# Patient Record
Sex: MALE | Race: WHITE | NOT HISPANIC OR LATINO | URBAN - METROPOLITAN AREA
[De-identification: names, ages, dates, MRNs, and addresses within clinical notes are randomized per-mention and may not be internally consistent; named-entity substitution may affect disease eponyms.]

---

## 2021-12-20 ENCOUNTER — EMERGENCY (EMERGENCY)
Facility: HOSPITAL | Age: 66
LOS: 0 days | Discharge: HOME | End: 2021-12-20
Attending: EMERGENCY MEDICINE
Payer: SELF-PAY

## 2021-12-20 VITALS
SYSTOLIC BLOOD PRESSURE: 172 MMHG | OXYGEN SATURATION: 94 % | DIASTOLIC BLOOD PRESSURE: 100 MMHG | HEART RATE: 61 BPM | TEMPERATURE: 99 F | RESPIRATION RATE: 18 BRPM

## 2021-12-20 VITALS — HEIGHT: 67 IN | WEIGHT: 134.92 LBS

## 2021-12-20 DIAGNOSIS — F11.10 OPIOID ABUSE, UNCOMPLICATED: ICD-10-CM

## 2021-12-20 DIAGNOSIS — Z86.19 PERSONAL HISTORY OF OTHER INFECTIOUS AND PARASITIC DISEASES: ICD-10-CM

## 2021-12-20 DIAGNOSIS — R10.12 LEFT UPPER QUADRANT PAIN: ICD-10-CM

## 2021-12-20 DIAGNOSIS — K52.9 NONINFECTIVE GASTROENTERITIS AND COLITIS, UNSPECIFIED: ICD-10-CM

## 2021-12-20 DIAGNOSIS — R11.2 NAUSEA WITH VOMITING, UNSPECIFIED: ICD-10-CM

## 2021-12-20 LAB
ALBUMIN SERPL ELPH-MCNC: 3.8 G/DL — SIGNIFICANT CHANGE UP (ref 3.3–5)
ALP SERPL-CCNC: 76 U/L — SIGNIFICANT CHANGE UP (ref 40–120)
ALT FLD-CCNC: 117 U/L — HIGH (ref 12–78)
ANION GAP SERPL CALC-SCNC: 6 MMOL/L — SIGNIFICANT CHANGE UP (ref 5–17)
APPEARANCE UR: CLEAR — SIGNIFICANT CHANGE UP
AST SERPL-CCNC: 89 U/L — HIGH (ref 15–37)
BASOPHILS # BLD AUTO: 0.03 K/UL — SIGNIFICANT CHANGE UP (ref 0–0.2)
BASOPHILS NFR BLD AUTO: 0.4 % — SIGNIFICANT CHANGE UP (ref 0–2)
BILIRUB SERPL-MCNC: 1 MG/DL — SIGNIFICANT CHANGE UP (ref 0.2–1.2)
BILIRUB UR-MCNC: NEGATIVE — SIGNIFICANT CHANGE UP
BUN SERPL-MCNC: 9 MG/DL — SIGNIFICANT CHANGE UP (ref 7–23)
CALCIUM SERPL-MCNC: 8.8 MG/DL — SIGNIFICANT CHANGE UP (ref 8.5–10.1)
CHLORIDE SERPL-SCNC: 100 MMOL/L — SIGNIFICANT CHANGE UP (ref 96–108)
CO2 SERPL-SCNC: 27 MMOL/L — SIGNIFICANT CHANGE UP (ref 22–31)
COLOR SPEC: YELLOW — SIGNIFICANT CHANGE UP
CREAT SERPL-MCNC: 0.84 MG/DL — SIGNIFICANT CHANGE UP (ref 0.5–1.3)
DIFF PNL FLD: NEGATIVE — SIGNIFICANT CHANGE UP
EOSINOPHIL # BLD AUTO: 0.01 K/UL — SIGNIFICANT CHANGE UP (ref 0–0.5)
EOSINOPHIL NFR BLD AUTO: 0.1 % — SIGNIFICANT CHANGE UP (ref 0–6)
GLUCOSE SERPL-MCNC: 130 MG/DL — HIGH (ref 70–99)
GLUCOSE UR QL: NEGATIVE MG/DL — SIGNIFICANT CHANGE UP
HCT VFR BLD CALC: 45.3 % — SIGNIFICANT CHANGE UP (ref 39–50)
HGB BLD-MCNC: 15.7 G/DL — SIGNIFICANT CHANGE UP (ref 13–17)
IMM GRANULOCYTES NFR BLD AUTO: 0.4 % — SIGNIFICANT CHANGE UP (ref 0–1.5)
KETONES UR-MCNC: NEGATIVE — SIGNIFICANT CHANGE UP
LEUKOCYTE ESTERASE UR-ACNC: NEGATIVE — SIGNIFICANT CHANGE UP
LIDOCAIN IGE QN: 29 U/L — LOW (ref 73–393)
LYMPHOCYTES # BLD AUTO: 1.88 K/UL — SIGNIFICANT CHANGE UP (ref 1–3.3)
LYMPHOCYTES # BLD AUTO: 28.1 % — SIGNIFICANT CHANGE UP (ref 13–44)
MCHC RBC-ENTMCNC: 33.8 PG — SIGNIFICANT CHANGE UP (ref 27–34)
MCHC RBC-ENTMCNC: 34.7 GM/DL — SIGNIFICANT CHANGE UP (ref 32–36)
MCV RBC AUTO: 97.4 FL — SIGNIFICANT CHANGE UP (ref 80–100)
MONOCYTES # BLD AUTO: 0.83 K/UL — SIGNIFICANT CHANGE UP (ref 0–0.9)
MONOCYTES NFR BLD AUTO: 12.4 % — SIGNIFICANT CHANGE UP (ref 2–14)
NEUTROPHILS # BLD AUTO: 3.9 K/UL — SIGNIFICANT CHANGE UP (ref 1.8–7.4)
NEUTROPHILS NFR BLD AUTO: 58.6 % — SIGNIFICANT CHANGE UP (ref 43–77)
NITRITE UR-MCNC: NEGATIVE — SIGNIFICANT CHANGE UP
PH UR: 6 — SIGNIFICANT CHANGE UP (ref 5–8)
PLATELET # BLD AUTO: 178 K/UL — SIGNIFICANT CHANGE UP (ref 150–400)
POTASSIUM SERPL-MCNC: 4.1 MMOL/L — SIGNIFICANT CHANGE UP (ref 3.5–5.3)
POTASSIUM SERPL-SCNC: 4.1 MMOL/L — SIGNIFICANT CHANGE UP (ref 3.5–5.3)
PROT SERPL-MCNC: 8.1 GM/DL — SIGNIFICANT CHANGE UP (ref 6–8.3)
PROT UR-MCNC: 15 MG/DL
RAPID RVP RESULT: SIGNIFICANT CHANGE UP
RBC # BLD: 4.65 M/UL — SIGNIFICANT CHANGE UP (ref 4.2–5.8)
RBC # FLD: 12 % — SIGNIFICANT CHANGE UP (ref 10.3–14.5)
SARS-COV-2 RNA SPEC QL NAA+PROBE: SIGNIFICANT CHANGE UP
SODIUM SERPL-SCNC: 133 MMOL/L — LOW (ref 135–145)
SP GR SPEC: 1.01 — SIGNIFICANT CHANGE UP (ref 1.01–1.02)
TROPONIN I, HIGH SENSITIVITY RESULT: 11.74 NG/L — SIGNIFICANT CHANGE UP
UROBILINOGEN FLD QL: NEGATIVE MG/DL — SIGNIFICANT CHANGE UP
WBC # BLD: 6.68 K/UL — SIGNIFICANT CHANGE UP (ref 3.8–10.5)
WBC # FLD AUTO: 6.68 K/UL — SIGNIFICANT CHANGE UP (ref 3.8–10.5)

## 2021-12-20 PROCEDURE — 83690 ASSAY OF LIPASE: CPT

## 2021-12-20 PROCEDURE — 71045 X-RAY EXAM CHEST 1 VIEW: CPT | Mod: 26

## 2021-12-20 PROCEDURE — 80053 COMPREHEN METABOLIC PANEL: CPT

## 2021-12-20 PROCEDURE — 81001 URINALYSIS AUTO W/SCOPE: CPT

## 2021-12-20 PROCEDURE — G1004: CPT

## 2021-12-20 PROCEDURE — 74177 CT ABD & PELVIS W/CONTRAST: CPT | Mod: 26,MG

## 2021-12-20 PROCEDURE — 96375 TX/PRO/DX INJ NEW DRUG ADDON: CPT

## 2021-12-20 PROCEDURE — 36415 COLL VENOUS BLD VENIPUNCTURE: CPT

## 2021-12-20 PROCEDURE — 71045 X-RAY EXAM CHEST 1 VIEW: CPT

## 2021-12-20 PROCEDURE — 85025 COMPLETE CBC W/AUTO DIFF WBC: CPT

## 2021-12-20 PROCEDURE — 93005 ELECTROCARDIOGRAM TRACING: CPT

## 2021-12-20 PROCEDURE — 96374 THER/PROPH/DIAG INJ IV PUSH: CPT

## 2021-12-20 PROCEDURE — 84484 ASSAY OF TROPONIN QUANT: CPT

## 2021-12-20 PROCEDURE — 93010 ELECTROCARDIOGRAM REPORT: CPT

## 2021-12-20 PROCEDURE — 80074 ACUTE HEPATITIS PANEL: CPT

## 2021-12-20 PROCEDURE — 99285 EMERGENCY DEPT VISIT HI MDM: CPT

## 2021-12-20 PROCEDURE — 96361 HYDRATE IV INFUSION ADD-ON: CPT

## 2021-12-20 PROCEDURE — 99284 EMERGENCY DEPT VISIT MOD MDM: CPT | Mod: 25

## 2021-12-20 PROCEDURE — 0225U NFCT DS DNA&RNA 21 SARSCOV2: CPT

## 2021-12-20 PROCEDURE — 87521 HEPATITIS C PROBE&RVRS TRNSC: CPT

## 2021-12-20 PROCEDURE — 74177 CT ABD & PELVIS W/CONTRAST: CPT | Mod: MG

## 2021-12-20 RX ORDER — FAMOTIDINE 10 MG/ML
0.5 INJECTION INTRAVENOUS
Qty: 0 | Refills: 0 | DISCHARGE
Start: 2021-12-20 | End: 2022-01-02

## 2021-12-20 RX ORDER — METHADONE HYDROCHLORIDE 40 MG/1
50 TABLET ORAL ONCE
Refills: 0 | Status: DISCONTINUED | OUTPATIENT
Start: 2021-12-20 | End: 2021-12-20

## 2021-12-20 RX ORDER — ONDANSETRON 8 MG/1
4 TABLET, FILM COATED ORAL ONCE
Refills: 0 | Status: COMPLETED | OUTPATIENT
Start: 2021-12-20 | End: 2021-12-20

## 2021-12-20 RX ORDER — SODIUM CHLORIDE 9 MG/ML
1000 INJECTION INTRAMUSCULAR; INTRAVENOUS; SUBCUTANEOUS ONCE
Refills: 0 | Status: COMPLETED | OUTPATIENT
Start: 2021-12-20 | End: 2021-12-20

## 2021-12-20 RX ORDER — ONDANSETRON 8 MG/1
1 TABLET, FILM COATED ORAL
Qty: 20 | Refills: 0
Start: 2021-12-20

## 2021-12-20 RX ORDER — FAMOTIDINE 10 MG/ML
1 INJECTION INTRAVENOUS
Qty: 14 | Refills: 0
Start: 2021-12-20 | End: 2022-01-02

## 2021-12-20 RX ORDER — FAMOTIDINE 10 MG/ML
20 INJECTION INTRAVENOUS ONCE
Refills: 0 | Status: COMPLETED | OUTPATIENT
Start: 2021-12-20 | End: 2021-12-20

## 2021-12-20 RX ADMIN — FAMOTIDINE 20 MILLIGRAM(S): 10 INJECTION INTRAVENOUS at 16:29

## 2021-12-20 RX ADMIN — SODIUM CHLORIDE 1000 MILLILITER(S): 9 INJECTION INTRAMUSCULAR; INTRAVENOUS; SUBCUTANEOUS at 15:54

## 2021-12-20 RX ADMIN — ONDANSETRON 4 MILLIGRAM(S): 8 TABLET, FILM COATED ORAL at 14:54

## 2021-12-20 RX ADMIN — SODIUM CHLORIDE 1000 MILLILITER(S): 9 INJECTION INTRAMUSCULAR; INTRAVENOUS; SUBCUTANEOUS at 14:54

## 2021-12-20 RX ADMIN — METHADONE HYDROCHLORIDE 50 MILLIGRAM(S): 40 TABLET ORAL at 19:48

## 2021-12-20 NOTE — ED STATDOCS - PROGRESS NOTE DETAILS
Michael De La Fuente for attending Dr. Arora: 67 y/o male with a PMHx of aortic aneurysm presents to the ED c/o vomiting x3 days. Pt also reports he missed his Methadone dose today. NKDA. No other complaints at this time. Will send pt to main ED for further evaluation.

## 2021-12-20 NOTE — ED PROVIDER NOTE - OBJECTIVE STATEMENT
65 y/o M with PMH of hep C, opiate abuse, AAA on methadone presents with nausea/vomiting x 3 days. Pt states he is able to tolerate liquids, but has been unable to consume solids without vomiting. Denies fever, chills. Reports LUQ abdominal pain associated with vomiting. Has never had endoscopy. States he does not follow up with GI. Denies history of abdominal surgery. States he is passing stool without difficulty.

## 2021-12-20 NOTE — ED ADULT NURSE REASSESSMENT NOTE - NS ED NURSE REASSESS COMMENT FT1
pt. hypertensive, asymptomatic. MD Youssef made aware. okay to dc with PMD f/u. pt. educated on signs od symptomatic HTN, and instructed to call 911 or go to closest ER when symptoms occur.

## 2021-12-20 NOTE — ED PROVIDER NOTE - PATIENT PORTAL LINK FT
You can access the FollowMyHealth Patient Portal offered by [facility] by registering at the following website: [email or web address]. By joining [employer]’s FollowMyHealth portal, you will also be able to view your health information using other applications (apps) compatible with our system.

## 2021-12-20 NOTE — ED PROVIDER NOTE - SKIN, MLM
Skin normal color for race, warm, dry and intact. No evidence of rash. no tactile warmth, no jaundice

## 2021-12-20 NOTE — ED ADULT NURSE NOTE - OBJECTIVE STATEMENT
PT to ED from home for c/o abdominal pain, nausea, and vomiting x2 days. Denies fever and chills. Denies diarrhea. Denies chest pain and sob. PT a&ox4. No distress.

## 2021-12-20 NOTE — ED PROVIDER NOTE - ENMT, MLM
Airway patent, Nasal mucosa clear. Mouth with mildly dry mucus.  Throat has no vesicles, no oropharyngeal exudates and uvula is midline.

## 2021-12-20 NOTE — ED PROVIDER NOTE - PROGRESS NOTE DETAILS
Michael Youssef : 67 y/o white M with a PMHx of drug abuse currently on Methadone program in [place] attempting to enter [facility], hepatitis C treated with interferon on the past (not on currently) ambulatory to ED c/o 3 days of nausea, loss of appetite, vomiting, unable to hold down solid food and occasional cramping abd pain and occasional chills. No fever. no urine complaints. no change in chronic cough. no SOB. no CP. No rash. Last methadone yesterday. no diarrhea. (+) flatus.  PCP: Dr. Beaulieu; No GI doctor Patient tolerating PO. mild sigmoid colitis, will treat symptomatically. WIll provide GI referral. - Guido Sheehan PA-C Michael Youssef : 65 y/o white M with a PMHx of drug abuse currently on Methadone program in [place] attempting to enter [facility], hepatitis C treated with interferon on the past (not on currently) ambulatory to ED c/o 3 days of nausea, loss of appetite, vomiting, unable to hold down solid food and occasional cramping abd pain and occasional chills. No fever. no urine complaints. no change in chronic cough. no SOB. no CP. No rash. Last methadone yesterday. no diarrhea. (+) flatus.  PCP: Dr. Beaulieu; No GI doctor       See P/E section for the P/E.

## 2021-12-20 NOTE — ED PROVIDER NOTE - CARE PROVIDER_API CALL
Martin Srinivasan)  Gastroenterology; Internal Medicine  [address] [place] [address]  Phone: [phone]  Fax: [phone]  Follow Up Time:

## 2021-12-20 NOTE — ED PROVIDER NOTE - CONSTITUTIONAL, MLM
normal... awake, alert, oriented to person, place, time/situation;  older white male appears older than stated age, no respiratory discomfort, mildly ill appearing but non toxic

## 2021-12-20 NOTE — ED PROVIDER NOTE - NS ED ROS FT
GEN: Denies fever, chills, sick contacts, recent travel  HEENT: Denies dizziness, blurry vision, HA  Cardiac: Denies CP, SOB, palpitations  Lungs: Denies cough, wheezing  PV: Denies LE swelling  GI: nausea/vomiting, LUQ pain. Denies diarrhea, constipation, flank pain  : Denies hematuria, dysuria, frequency, urgency  Skin: Denies rash, redness, open wound  MSK: Denies pain, decreased ROM  Neuro: Denies dizziness, difficulty speaking, difficulty swallowing, numbness/tingling in extremities, loss of bowel/bladder control, weakness in extremities

## 2021-12-20 NOTE — ED PROVIDER NOTE - PHYSICAL EXAMINATION
Gen: Well appearing. A&O x3.   HEENT: NC/AT. Dentition in good repair. No oropharyngeal erythema, tonsilar enlargement or exudates. Uvula midline. No submandibular or anterior cervical lymphadenopathy. TM well visualized bilaterally. Nares patent.  Cardiac: s1s2, RRR.  Lungs: CTAB, no chest wall tenderness.  Abdomen: NBS x4, soft, LUQ tenderness. No CVAT.  MSK: No obvious deformity to extremities. Full ROM in all extremities  Neuro: CN II-XII intact. Sensation intact to light touch in all extremities. 5/5 strength in all extremities.   PV: No LE edema or calf tenderness. Distal pulses 2+ in all extremities.

## 2021-12-20 NOTE — ED PROVIDER NOTE - CLINICAL SUMMARY MEDICAL DECISION MAKING FREE TEXT BOX
65 y/o white M with a PMHx of drug abuse currently on Methadone program in [place] attempting to enter [facility], hepatitis C treated with interferon on the past (not on currently) ambulatory to ED c/o 3 days of nausea, loss of appetite, vomiting, unable to hold down solid food and occasional cramping abd pain and occasional chills.  On exam, mildly ill non toxic, no focal abd tenderness or jaundice. no GI MD. Plan: EKG, CXR, CT abd pelvis, labs including lipase, RVP  / COVID, urine, troponin, IV fluids, IV Zofran Pepcid. monitor, observe, reassess

## 2021-12-20 NOTE — ED PROVIDER NOTE - ATTENDING CONTRIBUTION TO CARE
I, Vadim Youssef MD, personally saw the patient with the PA, and completed the key components of the history and physical exam. I then discussed the management plan with the PA.

## 2021-12-22 NOTE — ED POST DISCHARGE NOTE - DETAILS
No voicemail on number listed. Patient provided urgent GI referral at discharge. Will send letter via [employer]. - Guido Sheehan PA-C

## 2022-05-07 PROBLEM — B19.20 UNSPECIFIED VIRAL HEPATITIS C WITHOUT HEPATIC COMA: Chronic | Status: ACTIVE | Noted: 2021-12-24

## 2022-05-07 PROBLEM — F11.10 OPIOID ABUSE, UNCOMPLICATED: Chronic | Status: ACTIVE | Noted: 2021-12-24

## 2022-05-09 NOTE — ASU PATIENT PROFILE, ADULT - NSICDXPASTMEDICALHX_GEN_ALL_CORE_FT
PAST MEDICAL HISTORY:  Acute kidney injury     Acute myocardial infarction     Acute respiratory failure with hypoxia     Frostbite of finger     Hepatitis C     History of frostbite     History of gangrene     HLD (hyperlipidemia)     HTN (hypertension)     Hypothermia not due to cold exposure     Opiate abuse, episodic     Paroxysmal atrial fibrillation     Pulmonary emboli     Thrombocytopenia

## 2022-05-09 NOTE — ASU PATIENT PROFILE, ADULT - FALL HARM RISK - HARM RISK INTERVENTIONS

## 2022-05-10 ENCOUNTER — OUTPATIENT (OUTPATIENT)
Dept: INPATIENT UNIT | Facility: HOSPITAL | Age: 67
LOS: 1 days | Discharge: HOME | End: 2022-05-10
Payer: MEDICARE

## 2022-05-10 VITALS
HEIGHT: 67 IN | RESPIRATION RATE: 15 BRPM | OXYGEN SATURATION: 96 % | DIASTOLIC BLOOD PRESSURE: 97 MMHG | WEIGHT: 109.79 LBS | SYSTOLIC BLOOD PRESSURE: 170 MMHG | TEMPERATURE: 98 F | HEART RATE: 51 BPM

## 2022-05-10 DIAGNOSIS — I96 GANGRENE, NOT ELSEWHERE CLASSIFIED: ICD-10-CM

## 2022-05-10 DIAGNOSIS — M79.641 PAIN IN RIGHT HAND: ICD-10-CM

## 2022-05-10 DIAGNOSIS — T34.531A: ICD-10-CM

## 2022-05-10 PROCEDURE — U0003: CPT

## 2022-05-10 PROCEDURE — 99204 OFFICE O/P NEW MOD 45 MIN: CPT

## 2022-05-10 PROCEDURE — 88305 TISSUE EXAM BY PATHOLOGIST: CPT | Mod: 26

## 2022-05-10 PROCEDURE — 36415 COLL VENOUS BLD VENIPUNCTURE: CPT

## 2022-05-10 PROCEDURE — ZZZZZ: CPT

## 2022-05-10 PROCEDURE — 93010 ELECTROCARDIOGRAM REPORT: CPT

## 2022-05-10 PROCEDURE — 80048 BASIC METABOLIC PNL TOTAL CA: CPT

## 2022-05-10 PROCEDURE — U0005: CPT

## 2022-05-10 PROCEDURE — 97116 GAIT TRAINING THERAPY: CPT | Mod: GP

## 2022-05-10 PROCEDURE — 86850 RBC ANTIBODY SCREEN: CPT

## 2022-05-10 PROCEDURE — 93005 ELECTROCARDIOGRAM TRACING: CPT

## 2022-05-10 PROCEDURE — 86901 BLOOD TYPING SEROLOGIC RH(D): CPT

## 2022-05-10 PROCEDURE — 86900 BLOOD TYPING SEROLOGIC ABO: CPT

## 2022-05-10 PROCEDURE — 87070 CULTURE OTHR SPECIMN AEROBIC: CPT

## 2022-05-10 PROCEDURE — 97162 PT EVAL MOD COMPLEX 30 MIN: CPT | Mod: GP

## 2022-05-10 PROCEDURE — 88305 TISSUE EXAM BY PATHOLOGIST: CPT

## 2022-05-10 PROCEDURE — 87077 CULTURE AEROBIC IDENTIFY: CPT

## 2022-05-10 PROCEDURE — 87186 SC STD MICRODIL/AGAR DIL: CPT

## 2022-05-10 PROCEDURE — 85025 COMPLETE CBC W/AUTO DIFF WBC: CPT

## 2022-05-10 RX ORDER — TRAMADOL HYDROCHLORIDE 50 MG/1
50 TABLET ORAL EVERY 6 HOURS
Refills: 0 | Status: DISCONTINUED | OUTPATIENT
Start: 2022-05-10 | End: 2022-05-11

## 2022-05-10 RX ORDER — HYDROMORPHONE HYDROCHLORIDE 2 MG/ML
0.5 INJECTION INTRAMUSCULAR; INTRAVENOUS; SUBCUTANEOUS
Refills: 0 | Status: DISCONTINUED | OUTPATIENT
Start: 2022-05-10 | End: 2022-05-10

## 2022-05-10 RX ORDER — CARVEDILOL PHOSPHATE 80 MG/1
12.5 CAPSULE, EXTENDED RELEASE ORAL
Refills: 0 | Status: DISCONTINUED | OUTPATIENT
Start: 2022-05-10 | End: 2022-05-10

## 2022-05-10 RX ORDER — APIXABAN 2.5 MG/1
5 TABLET, FILM COATED ORAL
Refills: 0 | Status: DISCONTINUED | OUTPATIENT
Start: 2022-05-11 | End: 2022-05-11

## 2022-05-10 RX ORDER — ONDANSETRON 8 MG/1
4 TABLET, FILM COATED ORAL ONCE
Refills: 0 | Status: DISCONTINUED | OUTPATIENT
Start: 2022-05-10 | End: 2022-05-10

## 2022-05-10 RX ORDER — OXYCODONE HYDROCHLORIDE 5 MG/1
5 TABLET ORAL EVERY 4 HOURS
Refills: 0 | Status: DISCONTINUED | OUTPATIENT
Start: 2022-05-10 | End: 2022-05-11

## 2022-05-10 RX ORDER — CEFAZOLIN SODIUM 1 G
2000 VIAL (EA) INJECTION EVERY 8 HOURS
Refills: 0 | Status: COMPLETED | OUTPATIENT
Start: 2022-05-10 | End: 2022-05-11

## 2022-05-10 RX ORDER — SODIUM CHLORIDE 9 MG/ML
1000 INJECTION, SOLUTION INTRAVENOUS
Refills: 0 | Status: DISCONTINUED | OUTPATIENT
Start: 2022-05-10 | End: 2022-05-10

## 2022-05-10 RX ORDER — FAMOTIDINE 10 MG/ML
20 INJECTION INTRAVENOUS DAILY
Refills: 0 | Status: DISCONTINUED | OUTPATIENT
Start: 2022-05-10 | End: 2022-05-11

## 2022-05-10 RX ORDER — CARVEDILOL PHOSPHATE 80 MG/1
12.5 CAPSULE, EXTENDED RELEASE ORAL EVERY 12 HOURS
Refills: 0 | Status: DISCONTINUED | OUTPATIENT
Start: 2022-05-10 | End: 2022-05-11

## 2022-05-10 RX ORDER — OXYCODONE HYDROCHLORIDE 5 MG/1
5 TABLET ORAL ONCE
Refills: 0 | Status: DISCONTINUED | OUTPATIENT
Start: 2022-05-10 | End: 2022-05-10

## 2022-05-10 RX ORDER — ONDANSETRON 8 MG/1
4 TABLET, FILM COATED ORAL EVERY 6 HOURS
Refills: 0 | Status: DISCONTINUED | OUTPATIENT
Start: 2022-05-10 | End: 2022-05-11

## 2022-05-10 RX ORDER — NIFEDIPINE 30 MG
30 TABLET, EXTENDED RELEASE 24 HR ORAL DAILY
Refills: 0 | Status: DISCONTINUED | OUTPATIENT
Start: 2022-05-10 | End: 2022-05-11

## 2022-05-10 RX ORDER — FENTANYL CITRATE 50 UG/ML
50 INJECTION INTRAVENOUS
Refills: 0 | Status: DISCONTINUED | OUTPATIENT
Start: 2022-05-10 | End: 2022-05-10

## 2022-05-10 RX ORDER — OXYCODONE HYDROCHLORIDE 5 MG/1
10 TABLET ORAL EVERY 4 HOURS
Refills: 0 | Status: DISCONTINUED | OUTPATIENT
Start: 2022-05-10 | End: 2022-05-11

## 2022-05-10 RX ORDER — FERROUS SULFATE 325(65) MG
325 TABLET ORAL DAILY
Refills: 0 | Status: DISCONTINUED | OUTPATIENT
Start: 2022-05-10 | End: 2022-05-11

## 2022-05-10 RX ORDER — ACETAMINOPHEN 500 MG
1000 TABLET ORAL ONCE
Refills: 0 | Status: COMPLETED | OUTPATIENT
Start: 2022-05-10 | End: 2022-05-10

## 2022-05-10 RX ADMIN — CARVEDILOL PHOSPHATE 12.5 MILLIGRAM(S): 80 CAPSULE, EXTENDED RELEASE ORAL at 23:27

## 2022-05-10 RX ADMIN — HYDROMORPHONE HYDROCHLORIDE 0.5 MILLIGRAM(S): 2 INJECTION INTRAMUSCULAR; INTRAVENOUS; SUBCUTANEOUS at 17:04

## 2022-05-10 RX ADMIN — Medication 100 MILLIGRAM(S): at 23:27

## 2022-05-10 RX ADMIN — Medication 400 MILLIGRAM(S): at 16:58

## 2022-05-10 RX ADMIN — HYDROMORPHONE HYDROCHLORIDE 0.5 MILLIGRAM(S): 2 INJECTION INTRAMUSCULAR; INTRAVENOUS; SUBCUTANEOUS at 17:15

## 2022-05-10 RX ADMIN — HYDROMORPHONE HYDROCHLORIDE 0.5 MILLIGRAM(S): 2 INJECTION INTRAMUSCULAR; INTRAVENOUS; SUBCUTANEOUS at 21:39

## 2022-05-10 RX ADMIN — HYDROMORPHONE HYDROCHLORIDE 0.5 MILLIGRAM(S): 2 INJECTION INTRAMUSCULAR; INTRAVENOUS; SUBCUTANEOUS at 20:42

## 2022-05-10 RX ADMIN — OXYCODONE HYDROCHLORIDE 10 MILLIGRAM(S): 5 TABLET ORAL at 17:00

## 2022-05-10 RX ADMIN — Medication 1000 MILLIGRAM(S): at 17:04

## 2022-05-10 RX ADMIN — HYDROMORPHONE HYDROCHLORIDE 0.5 MILLIGRAM(S): 2 INJECTION INTRAMUSCULAR; INTRAVENOUS; SUBCUTANEOUS at 17:05

## 2022-05-10 RX ADMIN — HYDROMORPHONE HYDROCHLORIDE 0.5 MILLIGRAM(S): 2 INJECTION INTRAMUSCULAR; INTRAVENOUS; SUBCUTANEOUS at 16:50

## 2022-05-10 NOTE — PHYSICAL THERAPY INITIAL EVALUATION ADULT - PATIENT/FAMILY/SIGNIFICANT OTHER GOALS STATEMENT, PT EVAL
pt explains he collapsed in sitting position in [place] during a major nor'easter next to/behind  a restaurant 1/29/22,did not have gloves on

## 2022-05-10 NOTE — PHYSICAL THERAPY INITIAL EVALUATION ADULT - GENERAL OBSERVATIONS, REHAB EVAL
supine recumbent in bed ,wears eyeglasses,awake,alert,Ox3 ,L hand appears mummified at distal half all digits , R hand is elevated on pillows with bulky bandaging post-op (C/D/I)

## 2022-05-10 NOTE — PHYSICAL THERAPY INITIAL EVALUATION ADULT - ASSISTIVE DEVICE FOR TRANSFER: GAIT, REHAB EVAL
no device; explained to patient if need should arise that he requires increased gait support ,could likely manage a L axillary crutch in the 1st web space L hand

## 2022-05-10 NOTE — PROGRESS NOTE ADULT - SUBJECTIVE AND OBJECTIVE BOX
Postop Check    Patient tolerated the procedure well. Patient seen and examined at bedside. No acute complaints at this time. Pain well controlled. Denies chest pain, shortness of breath, nausea or vomiting.     PE:  Vital Signs Last 24 Hrs  T(C): 36.7 (05-10-22 @ 16:33), Max: 36.7 (05-10-22 @ 16:33)  T(F): 98.1 (05-10-22 @ 16:33), Max: 98.1 (05-10-22 @ 16:33)  HR: 69 (05-10-22 @ 17:30) (51 - 69)  BP: 117/54 (05-10-22 @ 17:30) (117/54 - 189/100)  BP(mean): --  RR: 14 (05-10-22 @ 17:30) (13 - 15)  SpO2: 96% (05-10-22 @ 17:30) (96% - 100%)    General: NAD, resting comfortably in bed  R UE:   Volar slab and Dressing C/D/I  Compartments soft and compressible  No calf tenderness bilaterally  +Axillary/Musculocutaneous/Radial/Median/AIN/PIN intact  SILT C5-T1  2+ radial pulses                A/P:  67y m s/p digit 1-5 ampuation  -PT/OT   -WBAT  -IV ABx for 24 hours  -Pain Control  -DVT ppx w/eliqius  -Continue perioperative abx x 24 hours  -FU AM Labs  -Rest, ice, compress and elevate the extremity as we needed  -Incentive Spirometry  -Medical management appreciated  -DispoL: nursing faiclity 5/11

## 2022-05-10 NOTE — PHYSICAL THERAPY INITIAL EVALUATION ADULT - ACTIVE RANGE OF MOTION EXAMINATION, REHAB EVAL
diminished /composite flexion all digits L hand with "mumification" due to dry gangrene; R shoulder/elbow WNL/Left UE Active ROM was WFL (within functional limits)/bilateral  lower extremity Active ROM was WFL (within functional limits)/deficits as listed below

## 2022-05-10 NOTE — PHYSICAL THERAPY INITIAL EVALUATION ADULT - PERTINENT HX OF CURRENT PROBLEM, REHAB EVAL
admitted from [facility] with h/o frostbite R hand with tissue necrosis for amputation affected fingers R hand

## 2022-05-10 NOTE — PHYSICAL THERAPY INITIAL EVALUATION ADULT - MUSCLE TONE ASSESSMENT, REHAB EVAL
+guarding R hand pt is lean/appears underweight,reports he is eating well but ca't seem to gain weight/bilateral upper extremities/bilateral lower extremities/neck/trunk/normal/mildly decreased tone

## 2022-05-10 NOTE — PHYSICAL THERAPY INITIAL EVALUATION ADULT - PRECAUTIONS/LIMITATIONS, REHAB EVAL
surgical precautions elevate R hand; pt relates h/o recent fall with dry scab/healing wound L elbow/fall precautions/surgical precautions

## 2022-05-10 NOTE — PHYSICAL THERAPY INITIAL EVALUATION ADULT - DIAGNOSIS, PT EVAL
Frostbite R hand with tissue necrosis s/p amputation R digits Frostbite B hands with tissue necrosis bilaterally affecting all digits , s/p amputation R digits 5/10/22

## 2022-05-10 NOTE — PHYSICAL THERAPY INITIAL EVALUATION ADULT - IMPAIRMENTS FOUND, PT EVAL
loss of dexterity/functional use both hands s/p amputation all digits R dominan hand (plans to return for amp L digits in future )/fine motor/integumentary integrity/muscle strength/ROM/sensory integrity

## 2022-05-10 NOTE — PHYSICAL THERAPY INITIAL EVALUATION ADULT - PATIENT PROFILE REVIEW, REHAB EVAL
yes pt states he lives alone in [place], his son & GF reside together nearby ; ot is legally  but not together with wife ; states he had a GF for 27 years but she passed away few years ago/yes

## 2022-05-10 NOTE — PHYSICAL THERAPY INITIAL EVALUATION ADULT - LONG TERM MEMORY, REHAB EVAL
pt with vague recall of events leading to his collapse in snow 1/29/22 ,states he was in a coma (?medically induced ) x 2 months at [facility] prior to going to rehab at [facility] ,was very weak /deconditioned due to being bedbound >2 months

## 2022-05-11 VITALS
TEMPERATURE: 98 F | DIASTOLIC BLOOD PRESSURE: 67 MMHG | RESPIRATION RATE: 16 BRPM | SYSTOLIC BLOOD PRESSURE: 131 MMHG | OXYGEN SATURATION: 100 % | HEART RATE: 66 BPM

## 2022-05-11 LAB
ANION GAP SERPL CALC-SCNC: 7 MMOL/L — SIGNIFICANT CHANGE UP (ref 5–17)
BASOPHILS # BLD AUTO: 0.03 K/UL — SIGNIFICANT CHANGE UP (ref 0–0.2)
BASOPHILS NFR BLD AUTO: 0.4 % — SIGNIFICANT CHANGE UP (ref 0–2)
BUN SERPL-MCNC: 22 MG/DL — SIGNIFICANT CHANGE UP (ref 7–23)
CALCIUM SERPL-MCNC: 8.5 MG/DL — SIGNIFICANT CHANGE UP (ref 8.5–10.1)
CHLORIDE SERPL-SCNC: 102 MMOL/L — SIGNIFICANT CHANGE UP (ref 96–108)
CO2 SERPL-SCNC: 27 MMOL/L — SIGNIFICANT CHANGE UP (ref 22–31)
CREAT SERPL-MCNC: 0.74 MG/DL — SIGNIFICANT CHANGE UP (ref 0.5–1.3)
EGFR: 101 ML/MIN/1.73M2 — SIGNIFICANT CHANGE UP
EOSINOPHIL # BLD AUTO: 0.14 K/UL — SIGNIFICANT CHANGE UP (ref 0–0.5)
EOSINOPHIL NFR BLD AUTO: 2 % — SIGNIFICANT CHANGE UP (ref 0–6)
GLUCOSE SERPL-MCNC: 90 MG/DL — SIGNIFICANT CHANGE UP (ref 70–99)
HCT VFR BLD CALC: 31.6 % — LOW (ref 39–50)
HGB BLD-MCNC: 10.1 G/DL — LOW (ref 13–17)
IMM GRANULOCYTES NFR BLD AUTO: 0.3 % — SIGNIFICANT CHANGE UP (ref 0–1.5)
LYMPHOCYTES # BLD AUTO: 0.99 K/UL — LOW (ref 1–3.3)
LYMPHOCYTES # BLD AUTO: 14.1 % — SIGNIFICANT CHANGE UP (ref 13–44)
MCHC RBC-ENTMCNC: 32 GM/DL — SIGNIFICANT CHANGE UP (ref 32–36)
MCHC RBC-ENTMCNC: 32 PG — SIGNIFICANT CHANGE UP (ref 27–34)
MCV RBC AUTO: 100 FL — SIGNIFICANT CHANGE UP (ref 80–100)
MONOCYTES # BLD AUTO: 1.01 K/UL — HIGH (ref 0–0.9)
MONOCYTES NFR BLD AUTO: 14.4 % — HIGH (ref 2–14)
NEUTROPHILS # BLD AUTO: 4.81 K/UL — SIGNIFICANT CHANGE UP (ref 1.8–7.4)
NEUTROPHILS NFR BLD AUTO: 68.8 % — SIGNIFICANT CHANGE UP (ref 43–77)
PLATELET # BLD AUTO: 219 K/UL — SIGNIFICANT CHANGE UP (ref 150–400)
POTASSIUM SERPL-MCNC: 4.4 MMOL/L — SIGNIFICANT CHANGE UP (ref 3.5–5.3)
POTASSIUM SERPL-SCNC: 4.4 MMOL/L — SIGNIFICANT CHANGE UP (ref 3.5–5.3)
RBC # BLD: 3.16 M/UL — LOW (ref 4.2–5.8)
RBC # FLD: 14.6 % — HIGH (ref 10.3–14.5)
SARS-COV-2 RNA SPEC QL NAA+PROBE: SIGNIFICANT CHANGE UP
SODIUM SERPL-SCNC: 136 MMOL/L — SIGNIFICANT CHANGE UP (ref 135–145)
WBC # BLD: 7 K/UL — SIGNIFICANT CHANGE UP (ref 3.8–10.5)
WBC # FLD AUTO: 7 K/UL — SIGNIFICANT CHANGE UP (ref 3.8–10.5)

## 2022-05-11 PROCEDURE — 93010 ELECTROCARDIOGRAM REPORT: CPT

## 2022-05-11 PROCEDURE — 99205 OFFICE O/P NEW HI 60 MIN: CPT

## 2022-05-11 RX ORDER — OXYCODONE HYDROCHLORIDE 5 MG/1
1 TABLET ORAL
Qty: 0 | Refills: 0 | DISCHARGE
Start: 2022-05-11

## 2022-05-11 RX ORDER — APIXABAN 2.5 MG/1
1 TABLET, FILM COATED ORAL
Qty: 0 | Refills: 0 | DISCHARGE
Start: 2022-05-11

## 2022-05-11 RX ORDER — TRAMADOL HYDROCHLORIDE 50 MG/1
1 TABLET ORAL
Qty: 0 | Refills: 0 | DISCHARGE
Start: 2022-05-11

## 2022-05-11 RX ADMIN — FAMOTIDINE 20 MILLIGRAM(S): 10 INJECTION INTRAVENOUS at 10:09

## 2022-05-11 RX ADMIN — Medication 325 MILLIGRAM(S): at 10:09

## 2022-05-11 RX ADMIN — TRAMADOL HYDROCHLORIDE 50 MILLIGRAM(S): 50 TABLET ORAL at 10:58

## 2022-05-11 RX ADMIN — CARVEDILOL PHOSPHATE 12.5 MILLIGRAM(S): 80 CAPSULE, EXTENDED RELEASE ORAL at 10:09

## 2022-05-11 RX ADMIN — Medication 1 TABLET(S): at 10:09

## 2022-05-11 RX ADMIN — APIXABAN 5 MILLIGRAM(S): 2.5 TABLET, FILM COATED ORAL at 10:45

## 2022-05-11 RX ADMIN — OXYCODONE HYDROCHLORIDE 10 MILLIGRAM(S): 5 TABLET ORAL at 01:36

## 2022-05-11 RX ADMIN — TRAMADOL HYDROCHLORIDE 50 MILLIGRAM(S): 50 TABLET ORAL at 10:09

## 2022-05-11 RX ADMIN — TRAMADOL HYDROCHLORIDE 50 MILLIGRAM(S): 50 TABLET ORAL at 03:49

## 2022-05-11 RX ADMIN — TRAMADOL HYDROCHLORIDE 50 MILLIGRAM(S): 50 TABLET ORAL at 04:30

## 2022-05-11 RX ADMIN — OXYCODONE HYDROCHLORIDE 10 MILLIGRAM(S): 5 TABLET ORAL at 02:10

## 2022-05-11 RX ADMIN — Medication 100 MILLIGRAM(S): at 05:48

## 2022-05-11 RX ADMIN — Medication 30 MILLIGRAM(S): at 10:45

## 2022-05-11 NOTE — DISCHARGE NOTE NURSING/CASE MANAGEMENT/SOCIAL WORK - NSDCPEFALRISK_GEN_ALL_CORE
For information on Fall & Injury Prevention, visit: [email or web address] OR  [email or web address] OR  https://www.cdc.gov/steadi/patient.html

## 2022-05-11 NOTE — DISCHARGE NOTE NURSING/CASE MANAGEMENT/SOCIAL WORK - PATIENT PORTAL LINK FT
You can access the FollowMyHealth Patient Portal offered by [facility] by registering at the following website: [email or web address]. By joining [facility]’s FollowMyHealth portal, you will also be able to view your health information using other applications (apps) compatible with our system.

## 2022-05-11 NOTE — DISCHARGE NOTE PROVIDER - NSDCCPCAREPLAN_GEN_ALL_CORE_FT
PRINCIPAL DISCHARGE DIAGNOSIS  Diagnosis: Gangrene of finger of right hand  Assessment and Plan of Treatment:

## 2022-05-11 NOTE — DISCHARGE NOTE PROVIDER - HOSPITAL COURSE
The patient is a 67 year old male status post amputation of right digits 1-5 for gangrene s/p frostbite after being admitted through [facility]. The Patient was medically optimized for the previously mentioned surgical procedure. The patient was taken to the operating room on date mentioned above. Prophylactic antibiotics were started before the procedure and continued for 24 hours. There were no complications during the procedure and patient tolerated the procedure well. The patient was transferred to recovery room in stable condition and subsequently to surgical floor.  Patient was placed on Eliquis for anticoagulation per his cardiologist.  All home medications were continued. The patient received physical therapy daily and daily labs were followed. The dressing was kept clean, dry, intact.  *The rest of the hospital stay was unremarkable.* The patient was discharged in stable condition to follow up as outpatient.

## 2022-05-11 NOTE — CONSULT NOTE ADULT - SUBJECTIVE AND OBJECTIVE BOX
PCP:      CHIEF COMPLAINT: b/l hands/frostbite and tissue necrosis to all digits    HISTORY OF THE PRESENT ILLNESS: this is a 68 yo male with pmh: PAF on eliquis, H/O MI, + Hep C, opiate abuse, was on Methadone but does not appear to be now, CORTES, HLD, HTN, + PE, who, back in January, ws outside overnight in a blizzard, found with hypothermia, acute resp failure and frostbite to all his fingers.  he was brought to [facility] in acute resp failure and was intubated and in a medically induced coma x 1 month. Hospital course was complicated by Rhabdo, CHRISTIN and needing to be dialyzed, new onset afib and Chest angio + for RLL PE, has IVC filter, + NSTEMi.  Eventually transferred to [facility] rehab where he currently resides.   He is now here for amputation to all his digits on his right hand, POD#1,   Pt is seen at bedside, vague historian, awake, alert, please, Right hand with large dsg and ace bandage, Left 5 fingers all necrotic, without sensation.  Denies any Cp or sob, eliot PO .  Plan for further surgery for left hand at a later date.  We are consulted for medical management.    PAST MEDICAL HISTORY: as above    PAST SURGICAL HISTORY: none    FAMILY HISTORY:  mother dec: age 70's gastric Ca, Father ded: 70"s brain ca    SOCIAL HISTORY:  no smoking, rare alcohol, h/o opiate addiction, none currently    ALLERGIES: NKDA    HOME MEDS: see med rec    REVIEW OF SYSTEMS:   All 10 systems reviewed in detailed and found to be negative with the exception of what has already been described above    MEDICATIONS  (STANDING):  apixaban 5 milliGRAM(s) Oral two times a day  carvedilol 12.5 milliGRAM(s) Oral every 12 hours  famotidine    Tablet 20 milliGRAM(s) Oral daily  ferrous    sulfate 325 milliGRAM(s) Oral daily  multivitamin 1 Tablet(s) Oral daily  NIFEdipine XL 30 milliGRAM(s) Oral daily    MEDICATIONS  (PRN):  ondansetron Injectable 4 milliGRAM(s) IV Push every 6 hours PRN Nausea and/or Vomiting  oxyCODONE    IR 10 milliGRAM(s) Oral every 4 hours PRN Severe Pain (7 - 10)  oxyCODONE    IR 5 milliGRAM(s) Oral every 4 hours PRN Moderate Pain (4 - 6)  traMADol 50 milliGRAM(s) Oral every 6 hours PRN Mild Pain (1 - 3)      VITALS:  T(F): 98 (05-11-22 @ 08:45), Max: 98.1 (05-10-22 @ 16:33)  HR: 66 (05-11-22 @ 08:45) (51 - 69)  BP: 131/67 (05-11-22 @ 08:45) (117/54 - 189/100)  RR: 16 (05-11-22 @ 08:45) (12 - 17)  SpO2: 100% (05-11-22 @ 08:45) (96% - 100%)  Wt(kg): --    I&O's Summary    10 May 2022 07:01  -  11 May 2022 07:00  --------------------------------------------------------  IN: 1695 mL / OUT: 800 mL / NET: 895 mL        CAPILLARY BLOOD GLUCOSE      PHYSICAL EXAM:    GENERAL: Comfortable, no acute distress   HEAD:  Normocephalic, atraumatic  EYES: EOMI, PERRLA  HEENT: Moist mucous membranes  NECK: Supple, No JVD  NERVOUS SYSTEM:  Alert & Oriented X3, Motor Strength 5/5 B/L upper and lower extremities  CHEST/LUNG: Clear to auscultation bilaterally  HEART: Regular rate and rhythm  ABDOMEN: Soft, non tender, Nondistended, Bowel sounds present  GENITOURINARY: Voiding, no palpable bladder  EXTREMITIES:   No clubbing, cyanosis, or edema  MUSCULOSKELETAL- right hand ace dsg c/d/i, Left hand with 1-5 digits noted with + frostbite and tissue necrosis  SKIN-no rash      LABS:                      10.1   7.00  )-----------( 219      ( 11 May 2022 07:56 )             31.6     05-11    136  |  102  |  22  ----------------------------<  90  4.4   |  27  |  0.74    Ca    8.5      11 May 2022 07:56          IMP: 68 yo male with above pmh a/w:    #B/L fingers with + frostbite and tissue necrosis  #S/P right hand digits 1-5 amputation, POD#1  pain control oxycodone/tramadol  Iv abxs  dash Diet  PT eval    #Afib/HTN  cont eliquis, bb, ccb      #CORTES  on supplemental Iron  monitor hh    # HLD  not on a statin    #Vte prophylaxis  eliquis  venodynes  amb    Dispo back to rehab    Thank you for the consult, pt stable from a medical standpoint to be discharged back to [facility]       PCP:      CHIEF COMPLAINT: b/l hands/frostbite and tissue necrosis to all digits    HISTORY OF THE PRESENT ILLNESS: this is a 66 yo male with pmh: PAF on eliquis, H/O MI, + Hep C, opiate abuse, was on Methadone but does not appear to be now, CORTES, HLD, HTN, + PE, who back in January, was outside overnight in a blizzard, found with hypothermia, acute resp failure and frostbite to all his fingers.  he was brought to [facility] in acute resp failure and was intubated and in a medically induced coma x 1 month. Hospital course was complicated by Rhabdo, CHRISTIN and needing to be dialyzed, new onset afib and Chest angio + for RLL PE, has IVC filter, + NSTEMi.  Eventually transferred to [facility] rehab where he currently resides.   He is now here for amputation to all his digits on his right hand, POD#1,   Pt is seen at bedside, vague historian, awake, alert, please, Right hand with large dsg and ace bandage, Left 5 fingers all necrotic, without sensation.  Denies any Cp or sob, eliot PO .  Plan for further surgery for left hand at a later date.  We are consulted for medical management.    PAST MEDICAL HISTORY: as above    PAST SURGICAL HISTORY: none    FAMILY HISTORY:  mother dec: age 70's gastric Ca, Father ded: 70"s brain ca    SOCIAL HISTORY:  no smoking, rare alcohol, h/o opiate addiction, none currently    ALLERGIES: NKDA    HOME MEDS: see med rec    REVIEW OF SYSTEMS:   All 10 systems reviewed in detailed and found to be negative with the exception of what has already been described above    MEDICATIONS  (STANDING):  apixaban 5 milliGRAM(s) Oral two times a day  carvedilol 12.5 milliGRAM(s) Oral every 12 hours  famotidine    Tablet 20 milliGRAM(s) Oral daily  ferrous    sulfate 325 milliGRAM(s) Oral daily  multivitamin 1 Tablet(s) Oral daily  NIFEdipine XL 30 milliGRAM(s) Oral daily    MEDICATIONS  (PRN):  ondansetron Injectable 4 milliGRAM(s) IV Push every 6 hours PRN Nausea and/or Vomiting  oxyCODONE    IR 10 milliGRAM(s) Oral every 4 hours PRN Severe Pain (7 - 10)  oxyCODONE    IR 5 milliGRAM(s) Oral every 4 hours PRN Moderate Pain (4 - 6)  traMADol 50 milliGRAM(s) Oral every 6 hours PRN Mild Pain (1 - 3)      VITALS:  T(F): 98 (05-11-22 @ 08:45), Max: 98.1 (05-10-22 @ 16:33)  HR: 66 (05-11-22 @ 08:45) (51 - 69)  BP: 131/67 (05-11-22 @ 08:45) (117/54 - 189/100)  RR: 16 (05-11-22 @ 08:45) (12 - 17)  SpO2: 100% (05-11-22 @ 08:45) (96% - 100%)      PHYSICAL EXAM:    GENERAL: Comfortable, no acute distress   HEAD:  Normocephalic, atraumatic  EYES: EOMI, PERRLA  HEENT: Moist mucous membranes  NECK: Supple, No JVD  NERVOUS SYSTEM:  Alert & Oriented X3, Motor Strength 5/5 B/L upper and lower extremities  CHEST/LUNG: Clear to auscultation bilaterally  HEART: Regular rate and rhythm  ABDOMEN: Soft, non tender, Nondistended, Bowel sounds present  GENITOURINARY: Voiding, no palpable bladder  EXTREMITIES:   No clubbing, cyanosis, or edema  MUSCULOSKELETAL- right hand ace dsg c/d/i, Left hand with 1-5 digits noted with + frostbite and tissue necrosis  SKIN-no rash      LABS:                      10.1   7.00  )-----------( 219      ( 11 May 2022 07:56 )             31.6     05-11    136  |  102  |  22  ----------------------------<  90  4.4   |  27  |  0.74    Ca    8.5      11 May 2022 07:56          IMP: 66 yo male with above pmh a/w:    #B/L fingers with + frostbite and tissue necrosis  #S/P right hand digits 1-5 amputation, POD#1  pain control oxycodone/tramadol  Iv abxs  dash Diet  PT eval    #Afib/HTN  cont eliquis, bb, ccb      #CORTES  on supplemental Iron  monitor hh    # HLD  not on a statin    #Vte prophylaxis  eliquis  venodynes  amb    Dispo back to rehab    Thank you for the consult, pt stable from a medical standpoint to be discharged back to [facility]

## 2022-05-11 NOTE — CONSULT NOTE ADULT - NS ATTEND AMEND GEN_ALL_CORE FT
Pt seen and examined with np court avila. agree with documentation as above with changes made where appropriate.   67m, pmh of PE/PAF on eliquis, HEp C, MI, Opiate abuse was previously on methadone, HTN, HLD, who was admitted to [facility] in january with acute resp failure/hypothermia. had complicated course and was ultimately transferred to [facility].   He is now admitted for right hand digit amputations.     pain control, would limit narcotics d/t opiate abuse in [facility].   ambulate  incentive spirometry  bowel regimen.   continue bb, ccb  eliquis.     thanks, will follow.

## 2022-05-11 NOTE — CONSULT NOTE ADULT - ASSESSMENT
his is a 68 yo male with pmh: PAF on eliquis, [id number] Score: 3, H/O MI, + Hep C, opiate abuse, was on Methadone but does not appear to be now, CORTES, HLD, HTN, + PE, who, back in January, ws outside overnight in a blizzard, found with hypothermia, acute resp failure and frostbite to all his fingers. Pt s/p amputation of digits 1-5 on rt hand on 5-.  Pt has high thrombosis risk and requires anticoagulation treatment dose.  Pt to return for second procedure to left hand.     plan:  Resume Eliquis 5mg twice a day   le venodynes  : oob as tolerated  :thanks for consult will f/u  :dispo: rehab.

## 2022-05-11 NOTE — PROGRESS NOTE ADULT - SUBJECTIVE AND OBJECTIVE BOX
Patient seen and examined at bedside. No acute complaints at this time. Pain well controlled. Denies chest pain, shortness of breath, nausea or vomiting.     PE:  Vital Signs Last 24 Hrs  T(C): 36.4 (11 May 2022 05:11), Max: 36.7 (10 May 2022 16:33)  T(F): 97.6 (11 May 2022 05:11), Max: 98.1 (10 May 2022 16:33)  HR: 65 (11 May 2022 05:11) (51 - 69)  BP: 148/71 (11 May 2022 05:11) (117/54 - 189/100)  BP(mean): --  RR: 16 (11 May 2022 05:11) (12 - 17)  SpO2: 100% (11 May 2022 05:11) (96% - 100%)    General: NAD, resting comfortably in bed  R UE:   Volar slab and Dressing C/D/I  Compartments soft and compressible  No calf tenderness bilaterally  +Axillary/Musculocutaneous/Radial/Median/AIN/PIN intact  SILT C5-T1  2+ radial pulses      A/P:  67y m s/p digit 1-5 ampuation POD1    DC back to facility today  -PT/OT   -WBAT  -IV ABx for 24 hours - completed  -Pain Control  -DVT ppx w/eliqius  -FU AM Labs  -Rest, ice, compress and elevate the extremity as we needed  -Incentive Spirometry  -Medical management appreciated  -DispoL: nursing faiclity 5/11

## 2022-05-11 NOTE — DISCHARGE NOTE PROVIDER - CARE PROVIDER_API CALL
Chiki Locke)  Orthopaedic Surgery; Surgery of the Hand  [address] [place] [address]  Phone: [phone]  Fax: [phone]  Follow Up Time:

## 2022-05-11 NOTE — DISCHARGE NOTE PROVIDER - NSDCMRMEDTOKEN_GEN_ALL_CORE_FT
apixaban 5 mg oral tablet: 1 tab(s) orally 2 times a day  carvedilol 12.5 mg oral tablet: 1 tab(s) orally 2 times a day  ferrous sulfate 325 mg (65 mg elemental iron) oral tablet: orally once a day  Multiple Vitamins oral tablet: 1 tab(s) orally once a day  NIFEdipine 30 mg oral tablet, extended release: 1 tab(s) orally once a day  ondansetron 4 mg oral tablet, disintegratin tab(s) orally every 8 hours, As Needed -for nausea   oxyCODONE 10 mg oral tablet: 1 tab(s) orally every 4 hours, As needed, Severe Pain (7 - 10)  oxyCODONE 5 mg oral tablet: 1 tab(s) orally every 4 hours, As needed, Moderate Pain (4 - 6)  Pepcid 40 mg oral tablet: 1 tab(s) orally once a day (at bedtime)   traMADol 50 mg oral tablet: 1 tab(s) orally every 6 hours, As needed, Mild Pain (1 - 3)  Vitamin C 500 mg oral tablet: 1 tab(s) orally once a day

## 2022-05-11 NOTE — DISCHARGE NOTE PROVIDER - NSDCFUADDINST_GEN_ALL_CORE_FT
1.	Pain Control  2.	Non-weight bearing affected extremity, with assistive devices as needed  3.	DVT Prophylaxis - Eliquis  4.	PT as needed  5.	Follow up with Dr. Locke as Outpatient in 10-14 Days after Discharge from the Hospital. Call Office For Appointment.  6.	Sutures to be removed Post-Op Day 14, and repeat x-rays  7.	Ice/Elevate affected area as needed  8.	Keep Dressing clean and dry

## 2022-05-11 NOTE — CONSULT NOTE ADULT - SUBJECTIVE AND OBJECTIVE BOX
HPII: This is a 68 yo male with pmh: PAF on eliquis, [id number] Score: 3, H/O MI, + Hep C, opiate abuse, was on Methadone but does not appear to be now, CORTES, HLD, HTN, + PE, who, back in January, ws outside overnight in a blizzard, found with hypothermia, acute resp failure and frostbite to all his fingers.  he was brought to [facility] in acute resp failure and was intubated and in a medically induced coma x 1 month. Hospital course was complicated by Rhabdo, CHRISTIN and needing to be dialyzed, new onset afib and Chest angio + for RLL PE, has IVC filter, + NSTEMi.  Eventually transferred to [facility] where he currently resides.   He is now here for amputation to all his digits on his right hand, POD#1,   Pt is seen at bedside, vague historian, awake, alert, please, Right hand with large dsg and ace bandage, Left 5 fingers all necrotic, without sensation.  Denies any Cp or sob, eliot PO .  Plan for further surgery for left hand at a later date.      Patient is a 67y old  Male who presents with a chief complaint of frostbite to B/L fingers (11 May 2022 11:21) s/p amputaiton of digits 1-5 on rt hand on 5-.      Consulted by Dr. Chiki Locke for VTE prophylaxis, risk stratification, and anticoagulation management.    PAST MEDICAL & SURGICAL HISTORY:  Hepatitis C      Opiate abuse, episodic      Acute respiratory failure with hypoxia      Acute myocardial infarction      Pulmonary emboli on eliquis      Thrombocytopenia      Acute kidney injury      History of frostbite      Frostbite of finger      HTN (hypertension)      HLD (hyperlipidemia)      Hypothermia not due to cold exposure      Paroxysmal atrial fibrillation on eliquis      History of gangrene      No significant past surgical history          FAMILY HISTORY:      Interval Note:  2022: Pt seen at bedside on [facility].  Discussed him resuming his Eliquis.  No  concerns.  Pt states he is going to [place] rehab on discharge.       CAPRINI SCORE  AGE RELATED RISK FACTORS                                                       MOBILITY RELATED FACTORS  [ ] Age 41-60 years                                            (1 Point)                  [ ] Bed rest                                                        (1 Point)  [x ] Age: 61-74 years                                           (2 Points)                [ ] Plaster cast                                                   (2 Points)  [ ] Age= 75 years                                              (3 Points)                 [ ] Bed bound for more than 72 hours                   (2 Points)    DISEASE RELATED RISK FACTORS                                               GENDER SPECIFIC FACTORS  [ ] Edema in the lower extremities                       (1 Point)           [ ] Pregnancy                                                            (1 Point)  [ ] Varicose veins                                               (1 Point)                  [ ] Post-partum < 6 weeks                                      (1 Point)             [ ] BMI > 25 Kg/m2                                            (1 Point)                  [ ] Hormonal therapy or oral contraception       (1 Point)                 [ ] Sepsis (in the previous month)                        (1 Point)             [ ] History of pregnancy complications                (1Point)  [ ] Pneumonia or serious lung disease                                             [ ] Unexplained or recurrent  (=/>3), premature                                 (In the previous month)                               (1 Point)                birth with toxemia or growth-restricted infant (1 Point)  [ ] Abnormal pulmonary function test            (1 Point)                                   SURGERY RELATED RISK FACTORS  [ ] Acute myocardial infarction                       (1 Point)                  [ ]  Section                                         (1 Point)  [ ] Congestive heart failure (in the previous month) (1 Point)   [ ] Minor surgery   lasting <45 minutes       (1 Point)   [ ] Inflammatory bowel disease                             (1 Point)          [ ] Arthroscopic surgery                                  (2 Points)  [ ] Central venous access                                    (2 Points)            [ x] General surgery lasting >45 minutes      (2 Points)       [ ] Stroke (in the previous month)                  (5 Points)            [ ] Elective major lower extremity arthroplasty (5 Points)                                   [  ] Malignancy (present or past include skin melanoma                                          but exclude  basal skin cell)    (2 points)                                      TRAUMA RELATED RISK FACTORS                HEMATOLOGY RELATED FACTORS                                  [ ] Fracture of the hip, pelvis, or leg                       (5 Points)  [ ] Prior episodes of VTE                                     (3 Points)          [ ] Acute spinal cord injury (in the previous month)  (5 Points)  [ ] Positive family history for VTE                         (3 Points)       [ ] Paralysis (less than 1 month)                          (5 Points)  [ ] Prothrombin 85748 A                                      (3 Points)         [ ] Multiple Trauma (within 1month)                 (5Points)                                                                                                                                                                [ ] Factor V Leiden                                          (3 Points)                                OTHER RISK FACTORS                          [ ] Lupus anticoagulants                                     (3 Points)                       [ ] BMI > 40                          (1 Point)                                                         [ ] Anticardiolipin antibodies                                (3 Points)                   [ ] Smoking                              (1Point)                                                [ ] High homocysteine in the blood                      (3 Points)                [  ] Diabetes requiring insulin (1point)                         [ ] Other congenital or acquired thrombophilia       (3 Points)          [  ] Chemotherapy                   (1 Point)  [ ] Heparin induced thrombocytopenia                  (3 Points)             [  ] Blood Transfusion                (1 point)                                                                                                             Total Score [4      ]                                                                                                                                                                                                                                     [id number] Score: 3    IMPROVE Bleeding Risk Score: 2.5    Falls Risk:   High (  )  Mod (  )  Low ( x )  crcl:69.9         cr: .74         BMI 17.2            EBL: 20  ml          Denies any personal or familial history of clotting or bleeding disorders.    Allergies    No Known Allergies    Intolerances        REVIEW OF SYSTEMS    (  )Fever	     (  )Constipation	(  )SOB				(  )Headache	(  )Dysuria  (  )Chills	     (  )Melena	(  )Dyspnea present on exertion	                    (  )Dizziness                    (  )Polyuria  (  )Nausea	     (  )Hematochezia	(  )Cough			                    (  )Syncope   	(  )Hematuria  (  )Vomiting    (  )Chest Pain	(  )Wheezing			(  )Weakness  (  )Diarrhea     (  )Palpitations	(  )Anorexia			( x )Myalgia    Pertinent positives in HPI and daily subjective.  All other ROS negative.      Vital Signs Last 24 Hrs  T(C): 36.7 (11 May 2022 08:45), Max: 36.7 (10 May 2022 16:33)  T(F): 98 (11 May 2022 08:45), Max: 98.1 (10 May 2022 16:33)  HR: 66 (11 May 2022 08:45) (51 - 69)  BP: 131/67 (11 May 2022 08:45) (117/54 - 189/100)  BP(mean): --  RR: 16 (11 May 2022 08:45) (12 - 17)  SpO2: 100% (11 May 2022 08:45) (96% - 100%)    PHYSICAL EXAM:    Constitutional: Appears Well    Neurological: A& O x 3    Skin: Warm    Respiratory and Thorax: normal effort; Breath sounds: normal; No rales/wheezing/rhonchi  	  Cardiovascular: S1, S2, regular, NMBR	    Gastrointestinal: BS + x 4Q, nontender	    Genitourinary:  Bladder nondistended, nontender    Musculoskeletal:   General Right:   no muscle/joint tenderness,   normal tone, no joint swelling,   ROM: limited	    General Left:   no muscle/joint tenderness,   normal tone, no joint swelling,   ROM: full    Hand:  Right: Dressing CDI ace bandage noted  Hand : left  note all five digits with gangrene black not sensation.  Lower extrems:   Right: no calf tenderness              negative sherin's sign               + pedal pulses    Left:   no calf tenderness              negative sherin's sign               + pedal pulses                          10.1   7.00  )-----------( 219      ( 11 May 2022 07:56 )             31.6       05-11    136  |  102  |  22  ----------------------------<  90  4.4   |  27  |  0.74    Ca    8.5      11 May 2022 07:56        				    MEDICATIONS  (STANDING):  apixaban 5 milliGRAM(s) Oral two times a day  carvedilol 12.5 milliGRAM(s) Oral every 12 hours  famotidine    Tablet 20 milliGRAM(s) Oral daily  ferrous    sulfate 325 milliGRAM(s) Oral daily  multivitamin 1 Tablet(s) Oral daily  NIFEdipine XL 30 milliGRAM(s) Oral daily          DVT Prophylaxis:  LMWH                   (  )  Heparin SQ           (  )  Coumadin             (  )  Xarelto                  (  )  Eliquis                   ( x )  Venodynes           (x  )  Ambulation          (x  )  UFH                       (  )  Contraindicated  (  )  EC Aspirin             (  )

## 2022-05-17 DIAGNOSIS — T33.531A SUPERFICIAL FROSTBITE OF RIGHT FINGER(S), INITIAL ENCOUNTER: ICD-10-CM

## 2022-05-17 DIAGNOSIS — I10 ESSENTIAL (PRIMARY) HYPERTENSION: ICD-10-CM

## 2022-05-17 DIAGNOSIS — I48.91 UNSPECIFIED ATRIAL FIBRILLATION: ICD-10-CM
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
## 2022-05-17 DIAGNOSIS — Z79.811 LONG TERM (CURRENT) USE OF AROMATASE INHIBITORS: ICD-10-CM

## 2022-05-17 DIAGNOSIS — Z86.19 PERSONAL HISTORY OF OTHER INFECTIOUS AND PARASITIC DISEASES: ICD-10-CM

## 2022-05-17 DIAGNOSIS — Z87.891 PERSONAL HISTORY OF NICOTINE DEPENDENCE: ICD-10-CM
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
## 2022-05-17 DIAGNOSIS — Z86.718 PERSONAL HISTORY OF OTHER VENOUS THROMBOSIS AND EMBOLISM: ICD-10-CM

## 2022-05-17 DIAGNOSIS — I25.2 OLD MYOCARDIAL INFARCTION: ICD-10-CM

## 2022-05-17 DIAGNOSIS — Z79.52 LONG TERM (CURRENT) USE OF SYSTEMIC STEROIDS: ICD-10-CM

## 2022-05-17 DIAGNOSIS — I96 GANGRENE, NOT ELSEWHERE CLASSIFIED: ICD-10-CM

## 2022-05-20 PROBLEM — E78.5 HYPERLIPIDEMIA, UNSPECIFIED: Chronic | Status: ACTIVE | Noted: 2022-05-09

## 2022-05-20 PROBLEM — T33.539A: Chronic | Status: ACTIVE | Noted: 2022-05-09

## 2022-05-20 PROBLEM — Z91.89 OTHER SPECIFIED PERSONAL RISK FACTORS, NOT ELSEWHERE CLASSIFIED: Chronic | Status: ACTIVE | Noted: 2022-05-09

## 2022-05-20 PROBLEM — J96.01 ACUTE RESPIRATORY FAILURE WITH HYPOXIA: Chronic | Status: ACTIVE | Noted: 2022-05-09

## 2022-05-20 PROBLEM — I26.99 OTHER PULMONARY EMBOLISM WITHOUT ACUTE COR PULMONALE: Chronic | Status: ACTIVE | Noted: 2022-05-09

## 2022-05-20 PROBLEM — N17.9 ACUTE KIDNEY FAILURE, UNSPECIFIED: Chronic | Status: ACTIVE | Noted: 2022-05-09

## 2022-05-20 PROBLEM — Z86.79 PERSONAL HISTORY OF OTHER DISEASES OF THE CIRCULATORY SYSTEM: Chronic | Status: ACTIVE | Noted: 2022-05-09

## 2022-05-20 PROBLEM — D69.6 THROMBOCYTOPENIA, UNSPECIFIED: Chronic | Status: ACTIVE | Noted: 2022-05-09

## 2022-05-20 PROBLEM — I10 ESSENTIAL (PRIMARY) HYPERTENSION: Chronic | Status: ACTIVE | Noted: 2022-05-09

## 2022-05-20 PROBLEM — R68.0 HYPOTHERMIA, NOT ASSOCIATED WITH LOW ENVIRONMENTAL TEMPERATURE: Chronic | Status: ACTIVE | Noted: 2022-05-09

## 2022-05-20 PROBLEM — I21.9 ACUTE MYOCARDIAL INFARCTION, UNSPECIFIED: Chronic | Status: ACTIVE | Noted: 2022-05-09

## 2022-05-20 PROBLEM — I48.0 PAROXYSMAL ATRIAL FIBRILLATION: Chronic | Status: ACTIVE | Noted: 2022-05-09

## 2022-05-24 ENCOUNTER — OUTPATIENT (OUTPATIENT)
Dept: INPATIENT UNIT | Facility: HOSPITAL | Age: 67
LOS: 1 days | Discharge: HOME | End: 2022-05-24
Payer: MEDICARE

## 2022-05-24 VITALS
SYSTOLIC BLOOD PRESSURE: 129 MMHG | RESPIRATION RATE: 15 BRPM | WEIGHT: 107.59 LBS | OXYGEN SATURATION: 100 % | TEMPERATURE: 97 F | HEART RATE: 58 BPM | DIASTOLIC BLOOD PRESSURE: 75 MMHG | HEIGHT: 67 IN

## 2022-05-24 DIAGNOSIS — M79.642 PAIN IN LEFT HAND: ICD-10-CM

## 2022-05-24 DIAGNOSIS — Z89.9 ACQUIRED ABSENCE OF LIMB, UNSPECIFIED: Chronic | ICD-10-CM

## 2022-05-24 DIAGNOSIS — T34.532A: ICD-10-CM

## 2022-05-24 LAB — SARS-COV-2 RNA SPEC QL NAA+PROBE: SIGNIFICANT CHANGE UP

## 2022-05-24 PROCEDURE — 36415 COLL VENOUS BLD VENIPUNCTURE: CPT

## 2022-05-24 PROCEDURE — 86901 BLOOD TYPING SEROLOGIC RH(D): CPT

## 2022-05-24 PROCEDURE — 87186 SC STD MICRODIL/AGAR DIL: CPT

## 2022-05-24 PROCEDURE — 99213 OFFICE O/P EST LOW 20 MIN: CPT

## 2022-05-24 PROCEDURE — 88305 TISSUE EXAM BY PATHOLOGIST: CPT

## 2022-05-24 PROCEDURE — 99024 POSTOP FOLLOW-UP VISIT: CPT

## 2022-05-24 PROCEDURE — 87077 CULTURE AEROBIC IDENTIFY: CPT

## 2022-05-24 PROCEDURE — 86850 RBC ANTIBODY SCREEN: CPT

## 2022-05-24 PROCEDURE — 86900 BLOOD TYPING SEROLOGIC ABO: CPT

## 2022-05-24 PROCEDURE — 87075 CULTR BACTERIA EXCEPT BLOOD: CPT | Mod: 91

## 2022-05-24 PROCEDURE — 88305 TISSUE EXAM BY PATHOLOGIST: CPT | Mod: 26

## 2022-05-24 PROCEDURE — 87635 SARS-COV-2 COVID-19 AMP PRB: CPT

## 2022-05-24 PROCEDURE — 99203 OFFICE O/P NEW LOW 30 MIN: CPT

## 2022-05-24 PROCEDURE — 87070 CULTURE OTHR SPECIMN AEROBIC: CPT

## 2022-05-24 RX ORDER — ONDANSETRON 8 MG/1
4 TABLET, FILM COATED ORAL ONCE
Refills: 0 | Status: DISCONTINUED | OUTPATIENT
Start: 2022-05-24 | End: 2022-05-24

## 2022-05-24 RX ORDER — FERROUS SULFATE 325(65) MG
0 TABLET ORAL
Qty: 0 | Refills: 0 | DISCHARGE

## 2022-05-24 RX ORDER — ONDANSETRON 8 MG/1
4 TABLET, FILM COATED ORAL EVERY 6 HOURS
Refills: 0 | Status: DISCONTINUED | OUTPATIENT
Start: 2022-05-24 | End: 2022-05-25

## 2022-05-24 RX ORDER — SODIUM CHLORIDE 9 MG/ML
1000 INJECTION, SOLUTION INTRAVENOUS
Refills: 0 | Status: DISCONTINUED | OUTPATIENT
Start: 2022-05-24 | End: 2022-05-24

## 2022-05-24 RX ORDER — OXYCODONE HYDROCHLORIDE 5 MG/1
5 TABLET ORAL EVERY 4 HOURS
Refills: 0 | Status: DISCONTINUED | OUTPATIENT
Start: 2022-05-24 | End: 2022-05-25

## 2022-05-24 RX ORDER — NIFEDIPINE 30 MG
1 TABLET, EXTENDED RELEASE 24 HR ORAL
Qty: 0 | Refills: 0 | DISCHARGE

## 2022-05-24 RX ORDER — HYDROMORPHONE HYDROCHLORIDE 2 MG/ML
0.5 INJECTION INTRAMUSCULAR; INTRAVENOUS; SUBCUTANEOUS
Refills: 0 | Status: DISCONTINUED | OUTPATIENT
Start: 2022-05-24 | End: 2022-05-24

## 2022-05-24 RX ORDER — CARVEDILOL PHOSPHATE 80 MG/1
12.5 CAPSULE, EXTENDED RELEASE ORAL
Refills: 0 | Status: DISCONTINUED | OUTPATIENT
Start: 2022-05-24 | End: 2022-05-25

## 2022-05-24 RX ORDER — CARVEDILOL PHOSPHATE 80 MG/1
1 CAPSULE, EXTENDED RELEASE ORAL
Qty: 0 | Refills: 0 | DISCHARGE

## 2022-05-24 RX ORDER — APIXABAN 2.5 MG/1
5 TABLET, FILM COATED ORAL
Refills: 0 | Status: DISCONTINUED | OUTPATIENT
Start: 2022-05-25 | End: 2022-05-25

## 2022-05-24 RX ORDER — NIFEDIPINE 30 MG
30 TABLET, EXTENDED RELEASE 24 HR ORAL DAILY
Refills: 0 | Status: DISCONTINUED | OUTPATIENT
Start: 2022-05-24 | End: 2022-05-25

## 2022-05-24 RX ORDER — OXYCODONE HYDROCHLORIDE 5 MG/1
10 TABLET ORAL ONCE
Refills: 0 | Status: DISCONTINUED | OUTPATIENT
Start: 2022-05-24 | End: 2022-05-24

## 2022-05-24 RX ORDER — OXYCODONE HYDROCHLORIDE 5 MG/1
10 TABLET ORAL EVERY 4 HOURS
Refills: 0 | Status: DISCONTINUED | OUTPATIENT
Start: 2022-05-24 | End: 2022-05-25

## 2022-05-24 RX ORDER — FERROUS SULFATE 325(65) MG
325 TABLET ORAL
Refills: 0 | Status: DISCONTINUED | OUTPATIENT
Start: 2022-05-24 | End: 2022-05-25

## 2022-05-24 RX ORDER — ASCORBIC ACID 60 MG
1 TABLET,CHEWABLE ORAL
Qty: 0 | Refills: 0 | DISCHARGE

## 2022-05-24 RX ORDER — TRAMADOL HYDROCHLORIDE 50 MG/1
50 TABLET ORAL EVERY 6 HOURS
Refills: 0 | Status: DISCONTINUED | OUTPATIENT
Start: 2022-05-24 | End: 2022-05-25

## 2022-05-24 RX ORDER — FENTANYL CITRATE 50 UG/ML
50 INJECTION INTRAVENOUS
Refills: 0 | Status: DISCONTINUED | OUTPATIENT
Start: 2022-05-24 | End: 2022-05-24

## 2022-05-24 RX ORDER — CEFAZOLIN SODIUM 1 G
2000 VIAL (EA) INJECTION EVERY 8 HOURS
Refills: 0 | Status: COMPLETED | OUTPATIENT
Start: 2022-05-24 | End: 2022-05-25

## 2022-05-24 RX ADMIN — CARVEDILOL PHOSPHATE 12.5 MILLIGRAM(S): 80 CAPSULE, EXTENDED RELEASE ORAL at 22:41

## 2022-05-24 RX ADMIN — OXYCODONE HYDROCHLORIDE 10 MILLIGRAM(S): 5 TABLET ORAL at 23:10

## 2022-05-24 RX ADMIN — Medication 325 MILLIGRAM(S): at 22:41

## 2022-05-24 RX ADMIN — HYDROMORPHONE HYDROCHLORIDE 0.5 MILLIGRAM(S): 2 INJECTION INTRAMUSCULAR; INTRAVENOUS; SUBCUTANEOUS at 15:04

## 2022-05-24 RX ADMIN — HYDROMORPHONE HYDROCHLORIDE 0.5 MILLIGRAM(S): 2 INJECTION INTRAMUSCULAR; INTRAVENOUS; SUBCUTANEOUS at 14:54

## 2022-05-24 RX ADMIN — HYDROMORPHONE HYDROCHLORIDE 0.5 MILLIGRAM(S): 2 INJECTION INTRAMUSCULAR; INTRAVENOUS; SUBCUTANEOUS at 19:00

## 2022-05-24 RX ADMIN — SODIUM CHLORIDE 75 MILLILITER(S): 9 INJECTION, SOLUTION INTRAVENOUS at 14:44

## 2022-05-24 RX ADMIN — HYDROMORPHONE HYDROCHLORIDE 0.5 MILLIGRAM(S): 2 INJECTION INTRAMUSCULAR; INTRAVENOUS; SUBCUTANEOUS at 15:18

## 2022-05-24 RX ADMIN — OXYCODONE HYDROCHLORIDE 10 MILLIGRAM(S): 5 TABLET ORAL at 14:43

## 2022-05-24 RX ADMIN — HYDROMORPHONE HYDROCHLORIDE 0.5 MILLIGRAM(S): 2 INJECTION INTRAMUSCULAR; INTRAVENOUS; SUBCUTANEOUS at 15:20

## 2022-05-24 RX ADMIN — HYDROMORPHONE HYDROCHLORIDE 0.5 MILLIGRAM(S): 2 INJECTION INTRAMUSCULAR; INTRAVENOUS; SUBCUTANEOUS at 18:34

## 2022-05-24 RX ADMIN — HYDROMORPHONE HYDROCHLORIDE 0.5 MILLIGRAM(S): 2 INJECTION INTRAMUSCULAR; INTRAVENOUS; SUBCUTANEOUS at 14:43

## 2022-05-24 RX ADMIN — OXYCODONE HYDROCHLORIDE 10 MILLIGRAM(S): 5 TABLET ORAL at 15:18

## 2022-05-24 RX ADMIN — Medication 100 MILLIGRAM(S): at 19:58

## 2022-05-24 RX ADMIN — HYDROMORPHONE HYDROCHLORIDE 0.5 MILLIGRAM(S): 2 INJECTION INTRAMUSCULAR; INTRAVENOUS; SUBCUTANEOUS at 16:00

## 2022-05-24 RX ADMIN — FENTANYL CITRATE 50 MICROGRAM(S): 50 INJECTION INTRAVENOUS at 14:28

## 2022-05-24 RX ADMIN — FENTANYL CITRATE 50 MICROGRAM(S): 50 INJECTION INTRAVENOUS at 14:19

## 2022-05-24 NOTE — ASU PATIENT PROFILE, ADULT - FALL HARM RISK - HARM RISK INTERVENTIONS

## 2022-05-24 NOTE — DISCHARGE NOTE PROVIDER - NSDCMRMEDTOKEN_GEN_ALL_CORE_FT
apixaban 5 mg oral tablet: 1 tab(s) orally 2 times a day  carvedilol 12.5 mg oral tablet: 1 tab(s) orally 2 times a day  ferrous sulfate 325 mg (65 mg elemental iron) oral tablet: orally 2 times a day  Multiple Vitamins oral tablet: 1 tab(s) orally once a day  NIFEdipine 30 mg oral tablet, extended release: 1 tab(s) orally once a day  Pepcid 40 mg oral tablet: 0.5 tab(s) orally once a day (at bedtime)  traMADol 50 mg oral tablet: 1 tab(s) orally every 6 hours, As needed, Mild Pain (1 - 3)  Tylenol 500 mg oral tablet: 2 tab(s) orally every 6 hours, As Needed  Vitamin C 500 mg oral tablet: 1 tab(s) orally once a day   apixaban 5 mg oral tablet: 1 tab(s) orally 2 times a day  carvedilol 12.5 mg oral tablet: 1 tab(s) orally 2 times a day  ferrous sulfate 325 mg (65 mg elemental iron) oral tablet: orally 2 times a day  Multiple Vitamins oral tablet: 1 tab(s) orally once a day  NIFEdipine 30 mg oral tablet, extended release: 1 tab(s) orally once a day  oxyCODONE 10 mg oral tablet: 1 tab(s) orally every 4 hours, As needed, Severe Pain (7 - 10)  oxyCODONE 5 mg oral tablet: 1 tab(s) orally every 4 hours, As needed, Moderate Pain (4 - 6)  Pepcid 40 mg oral tablet: 0.5 tab(s) orally once a day (at bedtime)  Vitamin C 500 mg oral tablet: 1 tab(s) orally once a day   apixaban 5 mg oral tablet: 1 tab(s) orally 2 times a day  carvedilol 12.5 mg oral tablet: 1 tab(s) orally 2 times a day  ferrous sulfate 325 mg (65 mg elemental iron) oral tablet: orally 2 times a day  Multiple Vitamins oral tablet: 1 tab(s) orally once a day  NIFEdipine 30 mg oral tablet, extended release: 1 tab(s) orally once a day  oxyCODONE 5 mg oral tablet: 1 tab(s) orally every 4 hours, As needed, Severe pain  Pepcid 40 mg oral tablet: 0.5 tab(s) orally once a day (at bedtime)  Vitamin C 500 mg oral tablet: 1 tab(s) orally once a day

## 2022-05-24 NOTE — DISCHARGE NOTE PROVIDER - CARE PROVIDER_API CALL
Chiki Locke)  Orthopaedic Surgery; Surgery of the Hand  [address] [place] [address]  Phone: [phone]  Fax: [phone]  Follow Up Time:

## 2022-05-24 NOTE — ASU PATIENT PROFILE, ADULT - NSICDXPASTMEDICALHX_GEN_ALL_CORE_FT
PAST MEDICAL HISTORY:  Acute kidney injury     Acute myocardial infarction     Acute respiratory failure with hypoxia     Frostbite of finger     GERD (gastroesophageal reflux disease)     Hepatitis C     History of frostbite     History of gangrene     HLD (hyperlipidemia)     HTN (hypertension)     Hypothermia not due to cold exposure     Opiate abuse, episodic     Paroxysmal atrial fibrillation     Pulmonary emboli     Thrombocytopenia

## 2022-05-24 NOTE — PROGRESS NOTE ADULT - SUBJECTIVE AND OBJECTIVE BOX
Orthopedics Post-op Check    POD 0 amputation of 5 digits at proximal phalanges left hand   Pt seen and examined in PACU. Pain is controlled. Pt feeling well. No nausea or vomiting.     Vital Signs Last 24 Hrs  T(C): 36.1 (05-24-22 @ 14:03), Max: 36.2 (05-24-22 @ 09:45)  T(F): 97 (05-24-22 @ 14:03), Max: 97.2 (05-24-22 @ 09:45)  HR: 51 (05-24-22 @ 19:00) (51 - 66)  BP: 148/71 (05-24-22 @ 18:00) (127/63 - 155/84)  BP(mean): --  RR: 14 (05-24-22 @ 19:00) (12 - 18)  SpO2: 98% (05-24-22 @ 19:00) (96% - 100%)    Exam:  NAD AAOx3  Dressing clean and dry  Exposed compartments soft and compressible     A/P:  Stable POD 0 amputation of 5 digits at proximal phalanges left hand   -Analgesia  -Ppx ABX  -DVT PE ppx   -OOB PT   -Encourage IS   -Dispo planning, plan for SNF 5/25

## 2022-05-24 NOTE — CONSULT NOTE ADULT - SUBJECTIVE AND OBJECTIVE BOX
PCP:    CHIEF COMPLAINT:     HISTORY OF THE PRESENT ILLNESS: this is a 66 yo male known to our service for recent admission for digit amputation on right hand with pmh: PAF on eliquis, held 2 days ago. H/O MI, + Hep C, opiate abuse, was on Methadone but does not appear to be now, CORTES, HLD, HTN, + PE, who back in January, was outside overnight in a blizzard, found with hypothermia, acute resp failure and frostbite to all his fingers.  He was brought to [facility] in acute resp failure and was intubated and in a medically induced coma x 1 month. Hospital course was complicated by Rhabdo, CHRISTIN and needing to be dialyzed, new onset afib and Chest angio + for RLL PE, has IVC filter, + NSTEMi.  Eventually transferred to [facility] where he currently resides.   He is now here for amputation to all his digits on his left hand  Pt is seen in PACU, upset because he was told he would not be able to be fitted for a prosthesis for either hand, awake, alert, pleasant, left hand with large dsg and ace bandage, pt has noted right hand ace dressing from recent surgery  Denies any Cp or sob, eliot PO .    We are consulted for medical management.      PAST MEDICAL HISTORY: as above    PAST SURGICAL HISTORY: right hand amputation of 1-5 digits    FAMILY HISTORY:   mother: dec :age 70's: gastric ca, Father dec: age 70's: brain ca    SOCIAL HISTORY:  no smoking, no alcohol, h/o opiate addiction    ALLERGIES: NKDA    HOME MEDS: see med rec    REVIEW OF SYSTEMS:   All 10 systems reviewed in detailed and found to be negative with the exception of what has already been described above    MEDICATIONS  (STANDING):  carvedilol Oral Tab/Cap - Peds 12.5 milliGRAM(s) Oral two times a day  ferrous    sulfate 325 milliGRAM(s) Oral two times a day  lactated ringers. 1000 milliLiter(s) (75 mL/Hr) IV Continuous <Continuous>  NIFEdipine XL 30 milliGRAM(s) Oral daily    MEDICATIONS  (PRN):  fentaNYL    Injectable 50 MICROGram(s) IV Push every 5 minutes PRN Severe Pain (7 - 10)  HYDROmorphone  Injectable 0.5 milliGRAM(s) IV Push every 10 minutes PRN Severe Pain (7 - 10)  ondansetron Injectable 4 milliGRAM(s) IV Push once PRN Nausea and/or Vomiting      VITALS:  T(F): 97 (05-24-22 @ 14:03), Max: 97.2 (05-24-22 @ 09:45)  HR: 54 (05-24-22 @ 16:30) (54 - 64)  BP: 131/67 (05-24-22 @ 16:30) (127/63 - 155/84)  RR: 12 (05-24-22 @ 16:30) (12 - 16)  SpO2: 98% (05-24-22 @ 16:30) (98% - 100%)  Wt(kg): --    I&O's Summary    24 May 2022 07:01  -  24 May 2022 16:58  --------------------------------------------------------  IN: 900 mL / OUT: 0 mL / NET: 900 mL        CAPILLARY BLOOD GLUCOSE      PHYSICAL EXAM:    GENERAL: Comfortable, no acute distress   HEAD:  Normocephalic, atraumatic  EYES: EOMI, PERRLA  HEENT: Moist mucous membranes  NECK: Supple, No JVD  NERVOUS SYSTEM:  Alert & Oriented X3, Motor Strength 5/5 B/L upper and lower extremities  CHEST/LUNG: Clear to auscultation bilaterally  HEART: Regular rate and rhythm  ABDOMEN: Soft, non tender, Nondistended, Bowel sounds present  GENITOURINARY: Voiding, no palpable bladder  EXTREMITIES:   No clubbing, cyanosis, or edema  MUSCULOSKELETAL- B/L hand bulky ace dsgs intact  SKIN-no rash        LABS: pending            IMPRESSION: 66 yo male with above pmh a/w:  # left fingers 1-5 frostbite and tissue necrosis  #S?P amputation of digits 1-5 at proximal phalanges  pain control  IVabxs  dash diet  Pt eval    #afib/htn  resume eliquis, bb, ccb    #CORTES  on supplemental iron  monitor hh    #HLD  not on statin    #vte prophylaxis  eliquis  venodynes  amb    Thank you for the consult, will follow with you           PCP:    CHIEF COMPLAINT: frostbite to left hand digits 1-5, planned amputation    HISTORY OF THE PRESENT ILLNESS: this is a 66 yo male known to our service for recent admission for digit amputation on right hand with pmh: PAF on eliquis, held 2 days ago. H/O MI, + Hep C, opiate abuse, was on Methadone but does not appear to be now, CORTES, HLD, HTN, + PE, who back in January, was outside overnight in a blizzard, found with hypothermia, acute resp failure and frostbite to all his fingers.  He was brought to [facility] in acute resp failure and was intubated and in a medically induced coma x 1 month. Hospital course was complicated by Rhabdo, CHRISTIN and needing to be dialyzed, new onset afib and Chest angio + for RLL PE, has IVC filter, + NSTEMi.  Eventually transferred [facility] where he currently resides.   He is now here for amputation to all his digits on his left hand  Pt is seen in PACU, upset because he was told he would not be able to be fitted for a prosthesis for either hand, awake, alert, pleasant, left hand with large dsg and ace bandage, pt has noted right hand ace dressing from recent surgery  Denies any Cp or sob, eliot PO .    We are consulted for medical management.      PAST MEDICAL HISTORY: as above    PAST SURGICAL HISTORY: right hand amputation of 1-5 digits    FAMILY HISTORY:   mother: dec :age 70's: gastric ca, Father dec: age 70's: brain ca    SOCIAL HISTORY:  no smoking, no alcohol, h/o opiate addiction    ALLERGIES: NKDA    HOME MEDS: see med rec    REVIEW OF SYSTEMS:   All 10 systems reviewed in detailed and found to be negative with the exception of what has already been described above    MEDICATIONS  (STANDING):  carvedilol Oral Tab/Cap - Peds 12.5 milliGRAM(s) Oral two times a day  ferrous    sulfate 325 milliGRAM(s) Oral two times a day  lactated ringers. 1000 milliLiter(s) (75 mL/Hr) IV Continuous <Continuous>  NIFEdipine XL 30 milliGRAM(s) Oral daily    MEDICATIONS  (PRN):  fentaNYL    Injectable 50 MICROGram(s) IV Push every 5 minutes PRN Severe Pain (7 - 10)  HYDROmorphone  Injectable 0.5 milliGRAM(s) IV Push every 10 minutes PRN Severe Pain (7 - 10)  ondansetron Injectable 4 milliGRAM(s) IV Push once PRN Nausea and/or Vomiting      VITALS:  T(F): 97 (05-24-22 @ 14:03), Max: 97.2 (05-24-22 @ 09:45)  HR: 54 (05-24-22 @ 16:30) (54 - 64)  BP: 131/67 (05-24-22 @ 16:30) (127/63 - 155/84)  RR: 12 (05-24-22 @ 16:30) (12 - 16)  SpO2: 98% (05-24-22 @ 16:30) (98% - 100%)  Wt(kg): --    I&O's Summary    24 May 2022 07:01  -  24 May 2022 16:58  --------------------------------------------------------  IN: 900 mL / OUT: 0 mL / NET: 900 mL        CAPILLARY BLOOD GLUCOSE      PHYSICAL EXAM:    GENERAL: Comfortable, no acute distress   HEAD:  Normocephalic, atraumatic  EYES: EOMI, PERRLA  HEENT: Moist mucous membranes  NECK: Supple, No JVD  NERVOUS SYSTEM:  Alert & Oriented X3, Motor Strength 5/5 B/L upper and lower extremities  CHEST/LUNG: Clear to auscultation bilaterally  HEART: Regular rate and rhythm  ABDOMEN: Soft, non tender, Nondistended, Bowel sounds present  GENITOURINARY: Voiding, no palpable bladder  EXTREMITIES:   No clubbing, cyanosis, or edema  MUSCULOSKELETAL- B/L hand bulky ace dsgs intact  SKIN-no rash        LABS: pending            IMPRESSION: 66 yo male with above pmh a/w:  # left fingers 1-5 frostbite and tissue necrosis  #S?P amputation of digits 1-5 at proximal phalanges  pain control  IVabxs  dash diet  Pt eval    #afib/htn  resume eliquis, bb, ccb    #CORTES  on supplemental iron  monitor hh    #HLD  not on statin    #vte prophylaxis  eliquis  venodynes  amb    Thank you for the consult, will follow with you

## 2022-05-24 NOTE — PHYSICAL THERAPY INITIAL EVALUATION ADULT - PERTINENT HX OF CURRENT PROBLEM, REHAB EVAL
collapse in snow during [facility] 1/29/22 sustaining frostbite all digits B hands ,now with dry gangrene ; had digits R hand amputated few weeks ago ,returns for similar amputation L digits per Dr Locke (Hand surgeon)

## 2022-05-24 NOTE — DISCHARGE NOTE PROVIDER - NSDCFUADDINST_GEN_ALL_CORE_FT
1.	Pain Control as needed  2.	No weight bearing with left hand until otherwise instructed at follow up  3.	DVT Prophylaxis (Blood clot prevention): Please continue home Eliquis  4.	PT as needed  5.	Follow up with Dr. Locke as Outpatient in 10-14 Days after Discharge from the Hospital or Rehab. Call Office For Appointment.  6.	Dressing to be removed in clinic upon follow up.  7.	Ice affected area as Needed  8.	Keep Dressing  Clean and dry.   1.	Pain Control as needed  2.	No weight bearing with left and right hand until otherwise instructed at follow up  3.	DVT Prophylaxis (Blood clot prevention): Please continue home Eliquis  4.	PT as needed  5.	Follow up with Dr. Locke as Outpatient in 10-14 Days after Discharge from the Hospital or Rehab. Call Office For Appointment.  6.	Dressing to be removed in clinic upon follow up.  7.	Ice affected area as Needed  8.	Keep Dressing  Clean and dry. Can change PRN

## 2022-05-24 NOTE — DISCHARGE NOTE PROVIDER - HOSPITAL COURSE
The patient is a 67 year old male status post elective amputation of his left 1st to 5th digits after failing outpatient nonoperative conservative management. Patient presented to [facility] after being medically cleared for an elective surgical procedure. The patient was taken to the operating room on date mentioned above. Prophylactic antibiotics were started before the procedure and continued for 24 hours. There were no complications during the procedure and patient tolerated the procedure well. The patient was transferred to the recovery room in stable condition and subsequently to the surgical floor. The patient was placed on home Eliquis for anticoagulation. All home medications were continued. The patient received physical therapy daily and daily labs were followed. The dressing was kept clean, dry, intact. The rest of the hospital stay was unremarkable.

## 2022-05-24 NOTE — DISCHARGE NOTE PROVIDER - NSDCCPTREATMENT_GEN_ALL_CORE_FT
PRINCIPAL PROCEDURE  Procedure: Finger amputation  Findings and Treatment: left 1st-5th digits

## 2022-05-24 NOTE — DISCHARGE NOTE PROVIDER - NSDCCPCAREPLAN_GEN_ALL_CORE_FT
PRINCIPAL DISCHARGE DIAGNOSIS  Diagnosis: Gangrene of finger  Assessment and Plan of Treatment: left 1st-5th digits

## 2022-05-24 NOTE — PHYSICAL THERAPY INITIAL EVALUATION ADULT - DIAGNOSIS, PT EVAL
frostbite B hands with dry gangrene all fingers ,s/p amputation L hand digits 5/24/22 ,R hand digits 5/10/22

## 2022-05-24 NOTE — PHYSICAL THERAPY INITIAL EVALUATION ADULT - PATIENT PROFILE REVIEW, REHAB EVAL
pt familiar to me from recent admission after L digits amputated ,he has been living at [facility]

## 2022-05-25 VITALS
SYSTOLIC BLOOD PRESSURE: 133 MMHG | RESPIRATION RATE: 18 BRPM | DIASTOLIC BLOOD PRESSURE: 80 MMHG | HEART RATE: 68 BPM | TEMPERATURE: 98 F | OXYGEN SATURATION: 96 %

## 2022-05-25 RX ORDER — OXYCODONE HYDROCHLORIDE 5 MG/1
1 TABLET ORAL
Qty: 0 | Refills: 0 | DISCHARGE
Start: 2022-05-25

## 2022-05-25 RX ORDER — ACETAMINOPHEN 500 MG
2 TABLET ORAL
Qty: 0 | Refills: 0 | DISCHARGE

## 2022-05-25 RX ORDER — CARVEDILOL PHOSPHATE 80 MG/1
12.5 CAPSULE, EXTENDED RELEASE ORAL EVERY 12 HOURS
Refills: 0 | Status: DISCONTINUED | OUTPATIENT
Start: 2022-05-25 | End: 2022-05-25

## 2022-05-25 RX ADMIN — OXYCODONE HYDROCHLORIDE 10 MILLIGRAM(S): 5 TABLET ORAL at 03:11

## 2022-05-25 RX ADMIN — APIXABAN 5 MILLIGRAM(S): 2.5 TABLET, FILM COATED ORAL at 11:01

## 2022-05-25 RX ADMIN — OXYCODONE HYDROCHLORIDE 10 MILLIGRAM(S): 5 TABLET ORAL at 03:55

## 2022-05-25 RX ADMIN — Medication 325 MILLIGRAM(S): at 11:01

## 2022-05-25 RX ADMIN — Medication 100 MILLIGRAM(S): at 03:53

## 2022-05-25 RX ADMIN — OXYCODONE HYDROCHLORIDE 10 MILLIGRAM(S): 5 TABLET ORAL at 11:00

## 2022-05-25 RX ADMIN — CARVEDILOL PHOSPHATE 12.5 MILLIGRAM(S): 80 CAPSULE, EXTENDED RELEASE ORAL at 11:00

## 2022-05-25 RX ADMIN — OXYCODONE HYDROCHLORIDE 10 MILLIGRAM(S): 5 TABLET ORAL at 06:16

## 2022-05-25 RX ADMIN — Medication 30 MILLIGRAM(S): at 11:01

## 2022-05-25 NOTE — DISCHARGE NOTE NURSING/CASE MANAGEMENT/SOCIAL WORK - PATIENT PORTAL LINK FT
You can access the FollowMyHealth Patient Portal offered by [facility] by registering at the following website: [email or web address]. By joining [employer]’s FollowMyHealth portal, you will also be able to view your health information using other applications (apps) compatible with our system.

## 2022-05-25 NOTE — PATIENT PROFILE ADULT - FALL HARM RISK - HARM RISK INTERVENTIONS

## 2022-05-25 NOTE — PROGRESS NOTE ADULT - SUBJECTIVE AND OBJECTIVE BOX
Orthopedics    POD 1 amputation of 5 digits at proximal phalanges left hand   Pt seen and examined at bedside this AM. Pain is well controlled, patient in good spirits. Pt feeling well. No nausea or vomiting. No new numbnes/tingling b/l UE. Denies fevers/chills/ CP /SOB      LABS:      VITAL SIGNS:  T(C): 36.3 (05-24-22 @ 21:00), Max: 36.6 (05-24-22 @ 20:00)  HR: 62 (05-25-22 @ 04:16) (51 - 66)  BP: 128/69 (05-25-22 @ 04:16) (127/63 - 155/84)  RR: 18 (05-24-22 @ 21:00) (12 - 18)  SpO2: 98% (05-24-22 @ 21:00) (96% - 100%)    Exam:  NAD AAOx3  Dressing clean and dry, dressing over b/l hands  Exposed compartments soft and compressible     A/P:  Stable POD 1 amputation of 5 digits at proximal phalanges left hand   -Analgesia prn  -Ppx ABX   -DVT PE ppx w/ Eliquis  -OOB PT   -Encourage IS   -No further acute orthopaedic intervention indicated at present  -Dispo planning, plan for SNF 5/25 Orthopedics    POD 1 amputation of 5 digits at proximal phalanges left hand   Pt seen and examined at bedside this AM. Pain is well controlled, patient in good spirits. Pt feeling well. No nausea or vomiting. No new numbnes/tingling b/l UE. Denies fevers/chills/ CP /SOB      LABS:      VITAL SIGNS:  T(C): 36.3 (05-24-22 @ 21:00), Max: 36.6 (05-24-22 @ 20:00)  HR: 62 (05-25-22 @ 04:16) (51 - 66)  BP: 128/69 (05-25-22 @ 04:16) (127/63 - 155/84)  RR: 18 (05-24-22 @ 21:00) (12 - 18)  SpO2: 98% (05-24-22 @ 21:00) (96% - 100%)    Exam:  NAD AAOx3  Dressing clean and dry, dressing over b/l hands  Exposed compartments soft and compressible     A/P:  Stable POD 1 amputation of 5 digits at proximal phalanges left hand; S/p amputation R hand 5/10 2/2 frostbite     -Analgesia prn  -Ppx ABX x24 hrs  -DVT PE ppx w/ home Eliquis (cont)  -OOB PT   -Encourage IS   -No further acute orthopaedic intervention indicated at present  -Ortho stable for d/c  -Dispo planning, plan for SNF 5/25
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
## 2022-05-31 DIAGNOSIS — Z95.1 PRESENCE OF AORTOCORONARY BYPASS GRAFT: ICD-10-CM

## 2022-05-31 DIAGNOSIS — I10 ESSENTIAL (PRIMARY) HYPERTENSION: ICD-10-CM

## 2022-05-31 DIAGNOSIS — E11.9 TYPE 2 DIABETES MELLITUS WITHOUT COMPLICATIONS: ICD-10-CM

## 2022-05-31 DIAGNOSIS — D69.6 THROMBOCYTOPENIA, UNSPECIFIED: ICD-10-CM

## 2022-05-31 DIAGNOSIS — J96.01 ACUTE RESPIRATORY FAILURE WITH HYPOXIA: ICD-10-CM
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
## 2022-05-31 DIAGNOSIS — I25.2 OLD MYOCARDIAL INFARCTION: ICD-10-CM

## 2022-05-31 DIAGNOSIS — T34.532S: ICD-10-CM

## 2022-05-31 DIAGNOSIS — Z86.711 PERSONAL HISTORY OF PULMONARY EMBOLISM: ICD-10-CM

## 2022-05-31 DIAGNOSIS — I96 GANGRENE, NOT ELSEWHERE CLASSIFIED: ICD-10-CM

## 2022-05-31 DIAGNOSIS — I48.91 UNSPECIFIED ATRIAL FIBRILLATION: ICD-10-CM

## 2022-05-31 DIAGNOSIS — N17.9 ACUTE KIDNEY FAILURE, UNSPECIFIED: ICD-10-CM

## 2022-05-31 DIAGNOSIS — I25.10 ATHEROSCLEROTIC HEART DISEASE OF NATIVE CORONARY ARTERY WITHOUT ANGINA PECTORIS: ICD-10-CM

## 2022-05-31 DIAGNOSIS — Z86.19 PERSONAL HISTORY OF OTHER INFECTIOUS AND PARASITIC DISEASES: ICD-10-CM

## 2022-06-10 NOTE — ASU PATIENT PROFILE, ADULT - TEACHING/LEARNING FACTORS INFLUENCE READINESS TO LEARN
Medication:   Requested Prescriptions     Pending Prescriptions Disp Refills    fluticasone (FLONASE) 50 MCG/ACT nasal spray 48 g 3     Sig: SHAKE LIQUID AND USE 2 SPRAYS IN EACH NOSTRIL DAILY        Last Filled:      Patient Phone Number: [phone] (home) [phone] (work)    Last appt: 4/28/2022   Next appt: Visit date not found    Last OARRS:   RX Monitoring 1/9/2018   Attestation The Prescription Monitoring Report for this patient was reviewed today.
none

## 2022-08-02 ENCOUNTER — OFFICE (OUTPATIENT)
Dept: URBAN - METROPOLITAN AREA CLINIC 12 | Facility: CLINIC | Age: 67
Setting detail: OPHTHALMOLOGY
End: 2022-08-02
Payer: MEDICARE

## 2022-08-02 DIAGNOSIS — H35.372: ICD-10-CM

## 2022-08-02 DIAGNOSIS — H25.13: ICD-10-CM

## 2022-08-02 DIAGNOSIS — H52.4: ICD-10-CM

## 2022-08-02 DIAGNOSIS — H35.361: ICD-10-CM

## 2022-08-02 PROBLEM — H52.7 REFRACTIVE ERROR: Status: ACTIVE | Noted: 2022-08-02

## 2022-08-02 PROCEDURE — 92250 FUNDUS PHOTOGRAPHY W/I&R: CPT | Performed by: OPTOMETRIST

## 2022-08-02 PROCEDURE — 92004 COMPRE OPH EXAM NEW PT 1/>: CPT | Performed by: OPTOMETRIST

## 2022-08-02 PROCEDURE — 92015 DETERMINE REFRACTIVE STATE: CPT | Performed by: OPTOMETRIST

## 2022-08-02 ASSESSMENT — TONOMETRY
OD_IOP_MMHG: 14
OS_IOP_MMHG: 13

## 2022-08-02 ASSESSMENT — KERATOMETRY
OS_K2POWER_DIOPTERS: 47.00
OS_K1POWER_DIOPTERS: 46.50
OS_AXISANGLE_DEGREES: 157
OD_K1POWER_DIOPTERS: 46.25
OD_AXISANGLE_DEGREES: 062
OD_K2POWER_DIOPTERS: 46.75

## 2022-08-02 ASSESSMENT — REFRACTION_AUTOREFRACTION
OS_AXIS: 070
OD_SPHERE: +3.00
OS_CYLINDER: -0.75
OS_SPHERE: +3.00
OD_CYLINDER: -0.25
OD_AXIS: 136

## 2022-08-02 ASSESSMENT — CONFRONTATIONAL VISUAL FIELD TEST (CVF)
OD_FINDINGS: FULL
OS_FINDINGS: FULL

## 2022-08-02 ASSESSMENT — SPHEQUIV_DERIVED
OD_SPHEQUIV: 2.875
OS_SPHEQUIV: 2.125
OS_SPHEQUIV: 2.625

## 2022-08-02 ASSESSMENT — VISUAL ACUITY
OS_BCVA: 20/60-2
OD_BCVA: 20/40-2

## 2022-08-02 ASSESSMENT — AXIALLENGTH_DERIVED
OS_AL: 21.7298
OD_AL: 21.5611
OS_AL: 21.5658

## 2022-08-02 ASSESSMENT — REFRACTION_MANIFEST
OS_ADD: +2.50
OS_SPHERE: +2.25
OD_ADD: +2.50
OS_AXIS: 070
OD_SPHERE: +2.50
OD_CYLINDER: SPH
OS_CYLINDER: -0.25

## 2023-05-18 NOTE — ASU PREOP CHECKLIST - TEMPERATURE IN CELSIUS (DEGREES C)
76-year-old comes in to establish care for positive Cologuard.  He does see [facility] for ITP did have a colonoscopy in 2018 with 4 polyps removed and did have a platelet transfusion before this status post splenectomy.  Last platelets that we have were 45.  Last colonoscopy was in 2018 with 4 polyps.  The one in the transverse colon was adenoma the rest were hyperplastic Last labs we have are from April which showed normal CMP and CBC other than platelets of 52 He comes in here for follow-up.  He just had a recent visit with his primary care and had a positive Cologuard. He is having normal daily bowel movements.  Denies hematochezia abdominal pain or weight loss.  No family history of GI malignancies.   Of note he did get a platelet transfusion the morning of his last colonoscopy
36.4

## 2024-05-08 NOTE — CONSULT NOTE ADULT - PROVIDER SPECIALTY LIST ADULT
Received signed and completed form from Physician's Office.    Form was faxed to [facility] [phone].     Hospitalist

## 2024-05-13 NOTE — DISCHARGE NOTE PROVIDER - EXTENDED VTE YES NO FOR MLM ENOXAPARIN
Anesthesia Evaluation     Patient summary reviewed and Nursing notes reviewed   NPO Solid Status: > 8 hours  NPO Liquid Status: > 6 hours           Airway   Mallampati: I  TM distance: >3 FB  Neck ROM: full  No difficulty expected  Dental - normal exam     Pulmonary - normal exam   (+) ,sleep apnea on CPAP  Cardiovascular - normal exam  Exercise tolerance: good (4-7 METS)    (+) hypertension well controlled less than 2 medications, hyperlipidemia      Neuro/Psych  (+) psychiatric history Anxiety and Depression  GI/Hepatic/Renal/Endo    (+) liver disease fatty liver disease, thyroid problem hypothyroidism    Musculoskeletal (-) negative ROS    Abdominal  - normal exam   Substance History - negative use     OB/GYN          Other      history of cancer remission                Anesthesia Plan    ASA 2     MAC   total IV anesthesia  intravenous induction     Anesthetic plan, risks, benefits, and alternatives have been provided, discussed and informed consent has been obtained with: patient.    CODE STATUS:          ,

## 2024-10-24 NOTE — ASU PATIENT PROFILE, ADULT - NSALCOHOLAMT_GEN_A_CORE_SD
Problem: Pain  Goal: Acceptable pain level achieved/maintained at rest using appropriate pain scale for the patient  Outcome: Monitoring/Evaluating progress  Goal: Acceptable pain level achieved/maintained with activity using appropriate pain scale for the patient  Outcome: Monitoring/Evaluating progress  Goal: Acceptable pain level achieved/maintained without oversedation  Outcome: Monitoring/Evaluating progress     Problem: At Risk for Falls  Goal: Patient does not fall  Outcome: Monitoring/Evaluating progress  Goal: Patient takes action to control fall-related risks  Outcome: Monitoring/Evaluating progress     Problem: At Risk for Injury Due to Fall  Goal: Patient does not fall  Outcome: Monitoring/Evaluating progress  Goal: Takes action to control condition specific risks  Outcome: Monitoring/Evaluating progress  Goal: Verbalizes understanding of fall-related injury personal risks  Description: Document education using the patient education activity  Outcome: Monitoring/Evaluating progress      5-6 drinks